# Patient Record
Sex: FEMALE | Race: WHITE | NOT HISPANIC OR LATINO | Employment: UNEMPLOYED | URBAN - METROPOLITAN AREA
[De-identification: names, ages, dates, MRNs, and addresses within clinical notes are randomized per-mention and may not be internally consistent; named-entity substitution may affect disease eponyms.]

---

## 2023-03-20 RX ORDER — FOLIC ACID/MULTIVIT,IRON,MINER 0.4MG-18MG
1 TABLET ORAL DAILY
COMMUNITY
Start: 2021-07-15 | End: 2024-04-11 | Stop reason: SDUPTHER

## 2023-03-20 RX ORDER — CEPHALEXIN 500 MG/1
1 TABLET ORAL 3 TIMES DAILY
COMMUNITY
Start: 2022-04-10 | End: 2023-10-03 | Stop reason: ALTCHOICE

## 2023-03-21 ENCOUNTER — APPOINTMENT (OUTPATIENT)
Dept: PRIMARY CARE | Facility: CLINIC | Age: 28
End: 2023-03-21
Payer: MEDICAID

## 2023-03-22 ENCOUNTER — APPOINTMENT (OUTPATIENT)
Dept: PRIMARY CARE | Facility: CLINIC | Age: 28
End: 2023-03-22
Payer: MEDICAID

## 2023-05-16 LAB
ALLERGEN FOOD: CASHEW NUT (ANACARDIUM OCCIDENTALE) IGE (KU/L): <0.1 KU/L
ALLERGEN FOOD: HAZELNUT IGE: 1.79 KU/L
ALLERGEN FOOD: PEANUT (ARACHIS HYPOGAEA) IGE (KU/L): 0.56 KU/L
ALLERGEN FOOD: PECAN NUT (CARYA ILLINOENSIS) IGE (KU/L): <0.1 KU/L
ALLERGEN FOOD: PISTACHIO (PISTACIA VERA) IGE (KU/L): <0.1 KU/L
ALLERGEN FOOD: SHRIMP (P. BOREALIS/MONODON, M. BARBATA/JOYNERI) IGE (KU/L): <0.1 KU/L
ALLERGEN FOOD: SOYBEAN (GLYCINE MAX) IGE (KU/L): 0.11 KU/L
ALLERGEN FOOD: WALNUT (JUGLANS SPP.) IGE (KU/L): 0.55 KU/L
IMMUNOCAP IGE: 130 KU/L (ref 0–214)
IMMUNOCAP INTERPRETATION: NORMAL

## 2023-05-18 LAB
ALLERGEN OCCUPATIONAL: SUNFLOWER SEED IGE (KU/L): 0.23 KU/L
C REACTIVE PROTEIN (MG/L) IN SER/PLAS: <0.1 MG/DL
CASHEW COMP. RA O3, VIRC: <0.1 KU/L
CLASS ARA H1, VIRC: 0
CLASS ARA H2, VIRC: 0
CLASS ARA H3, VIRC: 0
CLASS ARA H8, VIRC: 2
CLASS ARA H9, VIRC: 1
CLASS CASHEW RA O3 , VIRC: 0
CLASS HAZELNUT RCORA1, VIRC: 2
CLASS HAZELNUT RCORA14, VIRC: 0
CLASS HAZELNUT RCORA8, VIRC: ABNORMAL
CLASS HAZELNUT RCORA9, VIRC: 0
CLASS WALNUT RJUGR1, VIRC: 0
CLASS WALNUT RJUGR3, VIRC: ABNORMAL
COBALAMIN (VITAMIN B12) (PG/ML) IN SER/PLAS: 525 PG/ML (ref 211–911)
ERYTHROCYTE DISTRIBUTION WIDTH (RATIO) BY AUTOMATED COUNT: 13.1 % (ref 11.5–14.5)
ERYTHROCYTE MEAN CORPUSCULAR HEMOGLOBIN CONCENTRATION (G/DL) BY AUTOMATED: 31 G/DL (ref 32–36)
ERYTHROCYTE MEAN CORPUSCULAR VOLUME (FL) BY AUTOMATED COUNT: 95 FL (ref 80–100)
ERYTHROCYTES (10*6/UL) IN BLOOD BY AUTOMATED COUNT: 4.5 X10E12/L (ref 4–5.2)
FOLATE (NG/ML) IN SER/PLAS: >22.3 NG/ML
HAZELNUT COMP. RCORA1, VIRC: 1.64 KU/L
HAZELNUT COMP. RCORA14, VIRC: <0.1 KU/L
HAZELNUT COMP. RCORA8, VIRC: 0.34 KU/L
HAZELNUT COMP. RCORA9, VIRC: <0.1 KU/L
HEMATOCRIT (%) IN BLOOD BY AUTOMATED COUNT: 42.6 % (ref 36–46)
HEMOGLOBIN (G/DL) IN BLOOD: 13.2 G/DL (ref 12–16)
IMMUNOCAP INTERPRETATION (ARUP): NORMAL
LEUKOCYTES (10*3/UL) IN BLOOD BY AUTOMATED COUNT: 5.6 X10E9/L (ref 4.4–11.3)
Lab: 0.6 KU/L
Lab: <0.1 KU/L
PEANUT COMP. ARA H1, VIRC: <0.1 KU/L
PEANUT COMP. ARA H2, VIRC: <0.1 KU/L
PEANUT COMP. ARA H3, VIRC: <0.1 KU/L
PEANUT COMP. ARA H8, VIRC: 1.7 KU/L
PEANUT COMP. ARA H9, VIRC: 0.47 KU/L
PLATELETS (10*3/UL) IN BLOOD AUTOMATED COUNT: 250 X10E9/L (ref 150–450)
SEDIMENTATION RATE, ERYTHROCYTE: <1 MM/H (ref 0–20)
TISSUE TRANSGLUTAMINASE, IGA: <1 U/ML (ref 0–14)
WALNUT COMP. RJUGR1, VIRC: <0.1 KU/L
WALNUT COMP. RJUGR3, VIRC: 0.31 KU/L

## 2023-09-18 LAB — CHORIOGONADOTROPIN (MIU/ML) IN SER/PLAS: 650 MIU/ML

## 2023-09-21 LAB — CHORIOGONADOTROPIN (MIU/ML) IN SER/PLAS: 1884 MIU/ML

## 2023-09-23 LAB — CHORIOGONADOTROPIN (MIU/ML) IN SER/PLAS: 3905 MIU/ML

## 2023-09-25 LAB — CHORIOGONADOTROPIN (MIU/ML) IN SER/PLAS: ABNORMAL MIU/ML

## 2023-10-03 ENCOUNTER — TELEPHONE (OUTPATIENT)
Dept: OBSTETRICS AND GYNECOLOGY | Facility: CLINIC | Age: 28
End: 2023-10-03

## 2023-10-03 ENCOUNTER — INITIAL PRENATAL (OUTPATIENT)
Dept: OBSTETRICS AND GYNECOLOGY | Facility: CLINIC | Age: 28
End: 2023-10-03
Payer: MEDICAID

## 2023-10-03 VITALS
DIASTOLIC BLOOD PRESSURE: 64 MMHG | WEIGHT: 128.25 LBS | SYSTOLIC BLOOD PRESSURE: 116 MMHG | BODY MASS INDEX: 21.34 KG/M2

## 2023-10-03 DIAGNOSIS — Z3A.08 8 WEEKS GESTATION OF PREGNANCY (HHS-HCC): Primary | ICD-10-CM

## 2023-10-03 PROCEDURE — 99213 OFFICE O/P EST LOW 20 MIN: CPT | Performed by: OBSTETRICS & GYNECOLOGY

## 2023-10-03 PROCEDURE — H1000 PRENATAL CARE ATRISK ASSESSM: HCPCS | Performed by: OBSTETRICS & GYNECOLOGY

## 2023-10-03 ASSESSMENT — ENCOUNTER SYMPTOMS
HEMATOLOGIC/LYMPHATIC NEGATIVE: 0
NEUROLOGICAL NEGATIVE: 0
MUSCULOSKELETAL NEGATIVE: 0
CARDIOVASCULAR NEGATIVE: 0
GASTROINTESTINAL NEGATIVE: 0
RESPIRATORY NEGATIVE: 0
EYES NEGATIVE: 0
PSYCHIATRIC NEGATIVE: 0
ENDOCRINE NEGATIVE: 0
ALLERGIC/IMMUNOLOGIC NEGATIVE: 0
CONSTITUTIONAL NEGATIVE: 0

## 2023-10-03 NOTE — LETTER
October 3, 2023       No Recipients    Patient: Rubina Branch   YOB: 1995   Date of Visit: 10/3/2023       Dear Dr. Mcdonald Recipients:    Thank you for referring Rubina Branch to me for evaluation. Below are my notes for this consultation.  If you have questions, please do not hesitate to call me. I look forward to following your patient along with you.       Sincerely,     Gerda Monson MD      CC:   No Recipients  ______________________________________________________________________________________    Patient was opened in error. Please disregard .

## 2023-10-03 NOTE — TELEPHONE ENCOUNTER
Pt called stating she has a migraine and would like to know if she can take tylenol since she is 6 Wks pregnant. Please advise?

## 2023-10-03 NOTE — PROGRESS NOTES
Subjective   Patient ID [id number]   Rubina Branch is a 28 y.o.  at Unknown with a working estimated date of delivery of Estimated Date of Delivery: 24 who presents for an initial prenatal visit.    Her pregnancy is complicated by:  Bleeding 1 week mid cycle, then had pos preg test . Serial BHCGs rising.       OB History    Para Term  AB Living   3 1 1 0 1 1   SAB IAB Ectopic Multiple Live Births   0 1 0 0 1      # Outcome Date GA Lbr Herman/2nd Weight Sex Delivery Anes PTL Lv   3 Current            2 Term 22 39w0d  3062 g F Vag-Spont  N ISIDRA   1 IAB                   Objective   Physical Exam  Weight: 58.2 kg  Expected Total Weight Gain: Could not be calculated   Pregravid BMI: Could not be calculated  BP: 116/64    Fetal Heart Rate: 160     OBGyn Exam  Physical Exam  Constitutional:       Appearance: Normal appearance.   Pulmonary:      Effort: Pulmonary effort is normal.   Chest:   Breasts:     Breasts are symmetrical.      Right: Normal.      Left: Normal.   Abdominal:      General: Abdomen is flat.      Palpations: Abdomen is soft. There is no mass.   Genitourinary:     Labia:         Right: No rash.         Left: No rash.       Urethra: No urethral lesion.      Vagina: Normal.      Cervix: Normal.      Uterus: Normal.       Adnexa:         Right: No mass or tenderness.          Left: No mass or tenderness.      Prenatal Labs  Do next visit    Assessment/Plan     Single early live IUP seen w FHR .  Daily prenatal vitamins prescribed  First trimester screening and second trimester screening discussed. Patient decided to let me know next visit for genetics labs  Follow up in 4 weeks for return OB visit.    Gerda Monson MD

## 2023-10-04 NOTE — TELEPHONE ENCOUNTER
Spoke with the patient and explained that it was safe to take Tylenol in pregnancy. Patient is aware.

## 2023-10-05 LAB — BACTERIA UR CULT: NO GROWTH

## 2023-10-12 ENCOUNTER — APPOINTMENT (OUTPATIENT)
Dept: RADIOLOGY | Facility: CLINIC | Age: 28
End: 2023-10-12
Payer: MEDICAID

## 2023-10-31 ENCOUNTER — ROUTINE PRENATAL (OUTPATIENT)
Dept: OBSTETRICS AND GYNECOLOGY | Facility: CLINIC | Age: 28
End: 2023-10-31
Payer: MEDICAID

## 2023-10-31 VITALS — BODY MASS INDEX: 21.6 KG/M2 | DIASTOLIC BLOOD PRESSURE: 62 MMHG | WEIGHT: 129.8 LBS | SYSTOLIC BLOOD PRESSURE: 104 MMHG

## 2023-10-31 DIAGNOSIS — Z3A.10 10 WEEKS GESTATION OF PREGNANCY (HHS-HCC): Primary | ICD-10-CM

## 2023-10-31 LAB
ABO GROUP (TYPE) IN BLOOD: NORMAL
ANTIBODY SCREEN: NORMAL
ERYTHROCYTE [DISTWIDTH] IN BLOOD BY AUTOMATED COUNT: 13.4 % (ref 11.5–14.5)
HCT VFR BLD AUTO: 38.2 % (ref 36–46)
HGB BLD-MCNC: 12.4 G/DL (ref 12–16)
MCH RBC QN AUTO: 29.2 PG (ref 26–34)
MCHC RBC AUTO-ENTMCNC: 32.5 G/DL (ref 32–36)
MCV RBC AUTO: 90 FL (ref 80–100)
NRBC BLD-RTO: 0 /100 WBCS (ref 0–0)
PLATELET # BLD AUTO: 251 X10*3/UL (ref 150–450)
PMV BLD AUTO: 11.9 FL (ref 7.5–11.5)
RBC # BLD AUTO: 4.24 X10*6/UL (ref 4–5.2)
RH FACTOR (ANTIGEN D): NORMAL
WBC # BLD AUTO: 7.8 X10*3/UL (ref 4.4–11.3)

## 2023-10-31 PROCEDURE — 87389 HIV-1 AG W/HIV-1&-2 AB AG IA: CPT

## 2023-10-31 PROCEDURE — 86850 RBC ANTIBODY SCREEN: CPT

## 2023-10-31 PROCEDURE — 87340 HEPATITIS B SURFACE AG IA: CPT

## 2023-10-31 PROCEDURE — 87800 DETECT AGNT MULT DNA DIREC: CPT

## 2023-10-31 PROCEDURE — 99213 OFFICE O/P EST LOW 20 MIN: CPT | Performed by: OBSTETRICS & GYNECOLOGY

## 2023-10-31 PROCEDURE — 85027 COMPLETE CBC AUTOMATED: CPT

## 2023-10-31 PROCEDURE — 86317 IMMUNOASSAY INFECTIOUS AGENT: CPT

## 2023-10-31 PROCEDURE — 86901 BLOOD TYPING SEROLOGIC RH(D): CPT

## 2023-10-31 PROCEDURE — 36415 COLL VENOUS BLD VENIPUNCTURE: CPT

## 2023-10-31 PROCEDURE — 86900 BLOOD TYPING SEROLOGIC ABO: CPT

## 2023-10-31 PROCEDURE — 86780 TREPONEMA PALLIDUM: CPT

## 2023-10-31 NOTE — PROGRESS NOTES
NOB labs were drawn today with NIPTS, Pt states that she is vomiting most days and that yesterday she thinks she vomited blood. Pt DENIES Flu shot today.     Declines meds for nausea.  Declines 13 wk scan.  Will sched 20 wk scan.    Gerda Monson MD

## 2023-11-01 LAB
C TRACH RRNA SPEC QL NAA+PROBE: NEGATIVE
HBV SURFACE AG SERPL QL IA: NONREACTIVE
HIV 1+2 AB+HIV1 P24 AG SERPL QL IA: NONREACTIVE
N GONORRHOEA DNA SPEC QL PROBE+SIG AMP: NEGATIVE
REFLEX ADDED, ANEMIA PANEL: NORMAL
RUBV IGG SERPL IA-ACNC: 3.1 IA
RUBV IGG SERPL QL IA: POSITIVE
T PALLIDUM AB SER QL: NONREACTIVE

## 2023-11-20 ENCOUNTER — TELEPHONE (OUTPATIENT)
Dept: OBSTETRICS AND GYNECOLOGY | Facility: CLINIC | Age: 28
End: 2023-11-20

## 2023-11-20 ENCOUNTER — ROUTINE PRENATAL (OUTPATIENT)
Dept: OBSTETRICS AND GYNECOLOGY | Facility: CLINIC | Age: 28
End: 2023-11-20
Payer: MEDICAID

## 2023-11-20 VITALS
BODY MASS INDEX: 21.82 KG/M2 | WEIGHT: 131.13 LBS | SYSTOLIC BLOOD PRESSURE: 110 MMHG | DIASTOLIC BLOOD PRESSURE: 60 MMHG

## 2023-11-20 DIAGNOSIS — Z3A.19 19 WEEKS GESTATION OF PREGNANCY (HHS-HCC): Primary | ICD-10-CM

## 2023-11-20 DIAGNOSIS — Z3A.13 13 WEEKS GESTATION OF PREGNANCY (HHS-HCC): ICD-10-CM

## 2023-11-20 PROCEDURE — 99213 OFFICE O/P EST LOW 20 MIN: CPT | Performed by: OBSTETRICS & GYNECOLOGY

## 2023-11-20 NOTE — TELEPHONE ENCOUNTER
13.3 wk ob called OB line c/o bright red spotting this morning when she woke up, had blood on her bad and a drop in the toilet.  Just now she checked and it has some brown in it now.  Denies cramping or pain, most recent intercourse was Friday morning.  Patient is very nervous and asked if its possible for her to be seen today for reassurance before the holiday.  Patient advised that spotting/light bleeding after intercourse isn't uncommon and can happen directly following intercourse of a couple of days later.      Patient added to Dr Monson's schedule for 9:30 am.

## 2023-11-20 NOTE — PROGRESS NOTES
Spotting resolved. Cervix closed.   Live FHR seen on my US.  OB labs wnl.  Declined 13 wk felicita scan/ NIPT wnl.    Gerda Monson MD

## 2023-12-04 ENCOUNTER — APPOINTMENT (OUTPATIENT)
Dept: OBSTETRICS AND GYNECOLOGY | Facility: CLINIC | Age: 28
End: 2023-12-04
Payer: MEDICAID

## 2023-12-18 ENCOUNTER — ROUTINE PRENATAL (OUTPATIENT)
Dept: OBSTETRICS AND GYNECOLOGY | Facility: CLINIC | Age: 28
End: 2023-12-18
Payer: MEDICAID

## 2023-12-18 VITALS
DIASTOLIC BLOOD PRESSURE: 58 MMHG | WEIGHT: 135.38 LBS | BODY MASS INDEX: 22.53 KG/M2 | SYSTOLIC BLOOD PRESSURE: 104 MMHG

## 2023-12-18 DIAGNOSIS — Z3A.17 17 WEEKS GESTATION OF PREGNANCY (HHS-HCC): Primary | ICD-10-CM

## 2023-12-18 PROCEDURE — 99213 OFFICE O/P EST LOW 20 MIN: CPT | Performed by: OBSTETRICS & GYNECOLOGY

## 2024-01-02 ENCOUNTER — ANCILLARY PROCEDURE (OUTPATIENT)
Dept: RADIOLOGY | Facility: CLINIC | Age: 29
End: 2024-01-02
Payer: MEDICAID

## 2024-01-02 DIAGNOSIS — Z3A.19 19 WEEKS GESTATION OF PREGNANCY (HHS-HCC): ICD-10-CM

## 2024-01-02 PROCEDURE — 76805 OB US >/= 14 WKS SNGL FETUS: CPT | Performed by: OBSTETRICS & GYNECOLOGY

## 2024-01-02 PROCEDURE — 76805 OB US >/= 14 WKS SNGL FETUS: CPT

## 2024-01-15 ENCOUNTER — ROUTINE PRENATAL (OUTPATIENT)
Dept: OBSTETRICS AND GYNECOLOGY | Facility: CLINIC | Age: 29
End: 2024-01-15
Payer: MEDICAID

## 2024-01-15 VITALS — SYSTOLIC BLOOD PRESSURE: 118 MMHG | WEIGHT: 143 LBS | DIASTOLIC BLOOD PRESSURE: 70 MMHG | BODY MASS INDEX: 23.8 KG/M2

## 2024-01-15 DIAGNOSIS — Z3A.21 21 WEEKS GESTATION OF PREGNANCY (HHS-HCC): Primary | ICD-10-CM

## 2024-01-15 PROCEDURE — 99213 OFFICE O/P EST LOW 20 MIN: CPT | Performed by: OBSTETRICS & GYNECOLOGY

## 2024-01-15 RX ORDER — EPINEPHRINE 0.3 MG/.3ML
INJECTION SUBCUTANEOUS
COMMUNITY

## 2024-02-13 ENCOUNTER — ROUTINE PRENATAL (OUTPATIENT)
Dept: OBSTETRICS AND GYNECOLOGY | Facility: CLINIC | Age: 29
End: 2024-02-13
Payer: MEDICAID

## 2024-02-13 VITALS — WEIGHT: 147.25 LBS | SYSTOLIC BLOOD PRESSURE: 102 MMHG | BODY MASS INDEX: 24.5 KG/M2 | DIASTOLIC BLOOD PRESSURE: 58 MMHG

## 2024-02-13 DIAGNOSIS — Z3A.25 25 WEEKS GESTATION OF PREGNANCY (HHS-HCC): Primary | ICD-10-CM

## 2024-02-13 DIAGNOSIS — Z13.1 SCREENING FOR DIABETES MELLITUS: ICD-10-CM

## 2024-02-13 LAB
ERYTHROCYTE [DISTWIDTH] IN BLOOD BY AUTOMATED COUNT: 13.6 % (ref 11.5–14.5)
GLUCOSE 1H P 50 G GLC PO SERPL-MCNC: 103 MG/DL
HCT VFR BLD AUTO: 37.9 % (ref 36–46)
HGB BLD-MCNC: 12.1 G/DL (ref 12–16)
MCH RBC QN AUTO: 30.4 PG (ref 26–34)
MCHC RBC AUTO-ENTMCNC: 31.9 G/DL (ref 32–36)
MCV RBC AUTO: 95 FL (ref 80–100)
NRBC BLD-RTO: 0 /100 WBCS (ref 0–0)
PLATELET # BLD AUTO: 218 X10*3/UL (ref 150–450)
RBC # BLD AUTO: 3.98 X10*6/UL (ref 4–5.2)
WBC # BLD AUTO: 8.1 X10*3/UL (ref 4.4–11.3)

## 2024-02-13 PROCEDURE — 99213 OFFICE O/P EST LOW 20 MIN: CPT | Performed by: OBSTETRICS & GYNECOLOGY

## 2024-02-13 PROCEDURE — 36415 COLL VENOUS BLD VENIPUNCTURE: CPT

## 2024-02-13 PROCEDURE — 85027 COMPLETE CBC AUTOMATED: CPT

## 2024-02-13 PROCEDURE — 82947 ASSAY GLUCOSE BLOOD QUANT: CPT

## 2024-02-13 NOTE — PROGRESS NOTES
GTT and CBC drawn today.  Doing well. No complaints.   Saw darker urine = dip neg today.    TDAP next visit.    Gerda Monson MD

## 2024-02-14 LAB — REFLEX ADDED, ANEMIA PANEL: NORMAL

## 2024-03-11 PROBLEM — Z34.90 PRENATAL CARE, ANTEPARTUM (HHS-HCC): Status: ACTIVE | Noted: 2024-03-11

## 2024-03-12 ENCOUNTER — ROUTINE PRENATAL (OUTPATIENT)
Dept: OBSTETRICS AND GYNECOLOGY | Facility: CLINIC | Age: 29
End: 2024-03-12
Payer: MEDICAID

## 2024-03-12 ENCOUNTER — APPOINTMENT (OUTPATIENT)
Dept: OBSTETRICS AND GYNECOLOGY | Facility: CLINIC | Age: 29
End: 2024-03-12
Payer: MEDICAID

## 2024-03-12 VITALS
DIASTOLIC BLOOD PRESSURE: 60 MMHG | SYSTOLIC BLOOD PRESSURE: 102 MMHG | BODY MASS INDEX: 25.86 KG/M2 | WEIGHT: 155.38 LBS

## 2024-03-12 DIAGNOSIS — Z3A.29 29 WEEKS GESTATION OF PREGNANCY (HHS-HCC): Primary | ICD-10-CM

## 2024-03-12 DIAGNOSIS — Z34.90 PRENATAL CARE, ANTEPARTUM (HHS-HCC): ICD-10-CM

## 2024-03-12 PROCEDURE — 99213 OFFICE O/P EST LOW 20 MIN: CPT | Performed by: STUDENT IN AN ORGANIZED HEALTH CARE EDUCATION/TRAINING PROGRAM

## 2024-03-12 NOTE — PROGRESS NOTES
Subjective   Patient ID [id number]   Rubina Branch is a 29 y.o.  at 29w4d with a working estimated date of delivery of 2024, by Last Menstrual Period who presents for a routine prenatal visit. Good FM, no OB complaints. Some dyspnea with climbing stairs. Some low back pain.    Objective   Physical Exam  Vitals:    24 1032   BP: 102/60      Weight: 70.5 kg (155 lb 6 oz), Pregravid BMI: 21.30  Expected Total Weight Gain: 11.5 kg (25 lb)-16 kg (35 lb)   Fundal height and FHR per prenatal flowsheet    Assessment/Plan     Rubina Branch is a 29 y.o.  at 29w4d with a working estimated date of delivery of 2024, by Last Menstrual Period who presents for a routine prenatal visit.    Reviewed second tri labs, WNL including hgb 12. Follows a strictly vegan diet. Recommend starting vitamin B12 OTC as this is the only vitamin not found in plant-based diet. No evidence of B12 deficiency sufficient to cause anemia at this time. Discussed tdap, declined after counseling. Reviewed common discomforts of pregnancy, recommend pregnancy support belt for low back pain. Plans to breastfeed. Considering lactational amenorrhea for contraception. Did also discuss POPs, DMPA, and LARC. Considering trying for next child about 18 months after this pregnancy. Reviewed weight gain. Has gained 27 lb total this pregnancy with normal starting BMI. Discussed recommendation for 25-35 lb total weight gain. Measuring appropriately today. Remainder of plan per problem list as below.     Problem List Items Addressed This Visit       Prenatal care, antepartum    Overview     -Last pap (2021): NIL -> next due postpartum  -s/p risk-reducing cfDNA  -Declined flu vaccine  -Declined tdap  -Breast/lactational amenorrhea          Other Visit Diagnoses       29 weeks gestation of pregnancy    -  Primary          Follow up in 2 weeks for a routine prenatal visit.    Juanita Eden MD

## 2024-03-26 ENCOUNTER — ROUTINE PRENATAL (OUTPATIENT)
Dept: OBSTETRICS AND GYNECOLOGY | Facility: CLINIC | Age: 29
End: 2024-03-26
Payer: MEDICAID

## 2024-03-26 VITALS
BODY MASS INDEX: 25.65 KG/M2 | DIASTOLIC BLOOD PRESSURE: 52 MMHG | SYSTOLIC BLOOD PRESSURE: 102 MMHG | WEIGHT: 154.13 LBS

## 2024-03-26 DIAGNOSIS — Z34.90 PRENATAL CARE, ANTEPARTUM (HHS-HCC): ICD-10-CM

## 2024-03-26 DIAGNOSIS — Z3A.32 32 WEEKS GESTATION OF PREGNANCY (HHS-HCC): Primary | ICD-10-CM

## 2024-03-26 PROCEDURE — 99213 OFFICE O/P EST LOW 20 MIN: CPT | Performed by: OBSTETRICS & GYNECOLOGY

## 2024-04-11 ENCOUNTER — ROUTINE PRENATAL (OUTPATIENT)
Dept: OBSTETRICS AND GYNECOLOGY | Facility: CLINIC | Age: 29
End: 2024-04-11
Payer: MEDICAID

## 2024-04-11 VITALS — WEIGHT: 157 LBS | BODY MASS INDEX: 26.13 KG/M2 | DIASTOLIC BLOOD PRESSURE: 64 MMHG | SYSTOLIC BLOOD PRESSURE: 118 MMHG

## 2024-04-11 DIAGNOSIS — Z3A.34 34 WEEKS GESTATION OF PREGNANCY (HHS-HCC): Primary | ICD-10-CM

## 2024-04-11 PROCEDURE — 99213 OFFICE O/P EST LOW 20 MIN: CPT | Performed by: OBSTETRICS & GYNECOLOGY

## 2024-04-11 RX ORDER — FOLIC ACID/MULTIVIT,IRON,MINER 0.4MG-18MG
1 TABLET ORAL DAILY
Qty: 30 TABLET | Refills: 3 | Status: SHIPPED | OUTPATIENT
Start: 2024-04-11

## 2024-04-23 ENCOUNTER — ROUTINE PRENATAL (OUTPATIENT)
Dept: OBSTETRICS AND GYNECOLOGY | Facility: CLINIC | Age: 29
End: 2024-04-23
Payer: MEDICAID

## 2024-04-23 VITALS — DIASTOLIC BLOOD PRESSURE: 66 MMHG | BODY MASS INDEX: 26.19 KG/M2 | SYSTOLIC BLOOD PRESSURE: 110 MMHG | WEIGHT: 157.4 LBS

## 2024-04-23 DIAGNOSIS — Z3A.35 35 WEEKS GESTATION OF PREGNANCY (HHS-HCC): ICD-10-CM

## 2024-04-23 PROCEDURE — 99213 OFFICE O/P EST LOW 20 MIN: CPT | Performed by: OBSTETRICS & GYNECOLOGY

## 2024-04-23 PROCEDURE — 87081 CULTURE SCREEN ONLY: CPT

## 2024-04-25 LAB — GP B STREP GENITAL QL CULT: NORMAL

## 2024-04-30 ENCOUNTER — ROUTINE PRENATAL (OUTPATIENT)
Dept: OBSTETRICS AND GYNECOLOGY | Facility: CLINIC | Age: 29
End: 2024-04-30
Payer: MEDICAID

## 2024-04-30 VITALS — SYSTOLIC BLOOD PRESSURE: 104 MMHG | WEIGHT: 159.4 LBS | DIASTOLIC BLOOD PRESSURE: 58 MMHG | BODY MASS INDEX: 26.53 KG/M2

## 2024-04-30 DIAGNOSIS — Z3A.36 36 WEEKS GESTATION OF PREGNANCY (HHS-HCC): Primary | ICD-10-CM

## 2024-04-30 PROCEDURE — 99213 OFFICE O/P EST LOW 20 MIN: CPT | Performed by: OBSTETRICS & GYNECOLOGY

## 2024-05-02 ENCOUNTER — APPOINTMENT (OUTPATIENT)
Dept: PRIMARY CARE | Facility: CLINIC | Age: 29
End: 2024-05-02
Payer: MEDICAID

## 2024-05-07 ENCOUNTER — ROUTINE PRENATAL (OUTPATIENT)
Dept: OBSTETRICS AND GYNECOLOGY | Facility: CLINIC | Age: 29
End: 2024-05-07
Payer: MEDICAID

## 2024-05-07 VITALS — WEIGHT: 159.8 LBS | DIASTOLIC BLOOD PRESSURE: 60 MMHG | BODY MASS INDEX: 26.59 KG/M2 | SYSTOLIC BLOOD PRESSURE: 108 MMHG

## 2024-05-07 DIAGNOSIS — Z34.90 PRENATAL CARE, ANTEPARTUM (HHS-HCC): ICD-10-CM

## 2024-05-07 DIAGNOSIS — Z3A.37 37 WEEKS GESTATION OF PREGNANCY (HHS-HCC): Primary | ICD-10-CM

## 2024-05-07 PROCEDURE — 99213 OFFICE O/P EST LOW 20 MIN: CPT | Performed by: OBSTETRICS & GYNECOLOGY

## 2024-05-13 ENCOUNTER — ROUTINE PRENATAL (OUTPATIENT)
Dept: OBSTETRICS AND GYNECOLOGY | Facility: CLINIC | Age: 29
End: 2024-05-13
Payer: MEDICAID

## 2024-05-13 VITALS — DIASTOLIC BLOOD PRESSURE: 60 MMHG | WEIGHT: 159.8 LBS | BODY MASS INDEX: 26.59 KG/M2 | SYSTOLIC BLOOD PRESSURE: 102 MMHG

## 2024-05-13 DIAGNOSIS — Z3A.38 38 WEEKS GESTATION OF PREGNANCY (HHS-HCC): Primary | ICD-10-CM

## 2024-05-13 PROCEDURE — 99213 OFFICE O/P EST LOW 20 MIN: CPT | Performed by: OBSTETRICS & GYNECOLOGY

## 2024-05-13 NOTE — PROGRESS NOTES
Doing well. No complaints.   EFW 6.5 lbs     Labor plans discussed, wants to avoid pitocin- due to hearing its more painful.  Discussed concern if she nears 41 weeks .    Gerda Monson MD

## 2024-05-21 ENCOUNTER — ROUTINE PRENATAL (OUTPATIENT)
Dept: OBSTETRICS AND GYNECOLOGY | Facility: CLINIC | Age: 29
End: 2024-05-21
Payer: MEDICAID

## 2024-05-21 VITALS — BODY MASS INDEX: 26.96 KG/M2 | SYSTOLIC BLOOD PRESSURE: 108 MMHG | WEIGHT: 162 LBS | DIASTOLIC BLOOD PRESSURE: 62 MMHG

## 2024-05-21 DIAGNOSIS — Z3A.39 39 WEEKS GESTATION OF PREGNANCY (HHS-HCC): Primary | ICD-10-CM

## 2024-05-21 PROCEDURE — 99213 OFFICE O/P EST LOW 20 MIN: CPT | Performed by: OBSTETRICS & GYNECOLOGY

## 2024-05-21 NOTE — PROGRESS NOTES
Doing well. No complaints.   Cervix 1 cm , wants to avoid induction but declines  membrane stripping today. See me next week      Gerda Monson MD

## 2024-05-23 ENCOUNTER — ANESTHESIA EVENT (OUTPATIENT)
Dept: OBSTETRICS AND GYNECOLOGY | Facility: HOSPITAL | Age: 29
End: 2024-05-23
Payer: MEDICAID

## 2024-05-23 ENCOUNTER — ANESTHESIA (OUTPATIENT)
Dept: OBSTETRICS AND GYNECOLOGY | Facility: HOSPITAL | Age: 29
End: 2024-05-23
Payer: MEDICAID

## 2024-05-23 ENCOUNTER — HOSPITAL ENCOUNTER (INPATIENT)
Facility: HOSPITAL | Age: 29
LOS: 2 days | Discharge: HOME | End: 2024-05-25
Attending: OBSTETRICS & GYNECOLOGY | Admitting: OBSTETRICS & GYNECOLOGY
Payer: MEDICAID

## 2024-05-23 DIAGNOSIS — R52 PAIN: Primary | ICD-10-CM

## 2024-05-23 PROBLEM — R50.9 FEVER: Status: RESOLVED | Noted: 2024-05-23 | Resolved: 2024-05-23

## 2024-05-23 PROBLEM — B37.31 CANDIDIASIS OF VAGINA: Status: RESOLVED | Noted: 2024-05-23 | Resolved: 2024-05-23

## 2024-05-23 PROBLEM — Z37.9 NORMAL LABOR (HHS-HCC): Status: ACTIVE | Noted: 2024-05-23

## 2024-05-23 PROBLEM — O46.90 ANTEPARTUM HEMORRHAGE (HHS-HCC): Status: RESOLVED | Noted: 2024-05-23 | Resolved: 2024-05-23

## 2024-05-23 PROBLEM — N92.6 MISSED PERIOD: Status: RESOLVED | Noted: 2024-05-23 | Resolved: 2024-05-23

## 2024-05-23 PROBLEM — J30.89 ALLERGIC RHINITIS DUE TO HOUSE DUST MITE: Status: ACTIVE | Noted: 2024-05-23

## 2024-05-23 PROBLEM — R13.19 ESOPHAGEAL DYSPHAGIA: Status: ACTIVE | Noted: 2024-05-23
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
## 2024-05-23 PROBLEM — O41.00X0: Status: RESOLVED | Noted: 2024-05-23 | Resolved: 2024-05-23

## 2024-05-23 PROBLEM — T18.128A FOOD IMPACTION OF ESOPHAGUS: Status: RESOLVED | Noted: 2024-05-23 | Resolved: 2024-05-23

## 2024-05-23 PROBLEM — J45.909 ASTHMA (HHS-HCC): Status: ACTIVE | Noted: 2024-05-23

## 2024-05-23 PROBLEM — T78.1XXA ADVERSE FOOD REACTION: Status: ACTIVE | Noted: 2024-05-23

## 2024-05-23 PROBLEM — J30.1 ALLERGIC RHINITIS DUE TO POLLEN: Status: ACTIVE | Noted: 2024-05-23

## 2024-05-23 LAB
ABO GROUP (TYPE) IN BLOOD: NORMAL
ANTIBODY SCREEN: NORMAL
ERYTHROCYTE [DISTWIDTH] IN BLOOD BY AUTOMATED COUNT: 13.2 % (ref 11.5–14.5)
ERYTHROCYTE [DISTWIDTH] IN BLOOD BY AUTOMATED COUNT: 13.3 % (ref 11.5–14.5)
HCT VFR BLD AUTO: 38.8 % (ref 36–46)
HCT VFR BLD AUTO: 39.2 % (ref 36–46)
HGB BLD-MCNC: 12.5 G/DL (ref 12–16)
HGB BLD-MCNC: 12.6 G/DL (ref 12–16)
MCH RBC QN AUTO: 29.9 PG (ref 26–34)
MCH RBC QN AUTO: 30 PG (ref 26–34)
MCHC RBC AUTO-ENTMCNC: 31.9 G/DL (ref 32–36)
MCHC RBC AUTO-ENTMCNC: 32.5 G/DL (ref 32–36)
MCV RBC AUTO: 92 FL (ref 80–100)
MCV RBC AUTO: 94 FL (ref 80–100)
NRBC BLD-RTO: 0 /100 WBCS (ref 0–0)
NRBC BLD-RTO: 0 /100 WBCS (ref 0–0)
PLATELET # BLD AUTO: 143 X10*3/UL (ref 150–450)
PLATELET # BLD AUTO: 159 X10*3/UL (ref 150–450)
RBC # BLD AUTO: 4.18 X10*6/UL (ref 4–5.2)
RBC # BLD AUTO: 4.2 X10*6/UL (ref 4–5.2)
RH FACTOR (ANTIGEN D): NORMAL
TREPONEMA PALLIDUM IGG+IGM AB [PRESENCE] IN SERUM OR PLASMA BY IMMUNOASSAY: NONREACTIVE
WBC # BLD AUTO: 15.6 X10*3/UL (ref 4.4–11.3)
WBC # BLD AUTO: 8.4 X10*3/UL (ref 4.4–11.3)

## 2024-05-23 PROCEDURE — 1220000001 HC OB SEMI-PRIVATE ROOM DAILY

## 2024-05-23 PROCEDURE — 85027 COMPLETE CBC AUTOMATED: CPT | Performed by: ADVANCED PRACTICE MIDWIFE

## 2024-05-23 PROCEDURE — 86780 TREPONEMA PALLIDUM: CPT | Mod: STJLAB | Performed by: OBSTETRICS & GYNECOLOGY

## 2024-05-23 PROCEDURE — 86901 BLOOD TYPING SEROLOGIC RH(D): CPT | Performed by: OBSTETRICS & GYNECOLOGY

## 2024-05-23 PROCEDURE — 01967 NEURAXL LBR ANES VAG DLVR: CPT | Performed by: NURSE ANESTHETIST, CERTIFIED REGISTERED

## 2024-05-23 PROCEDURE — 85027 COMPLETE CBC AUTOMATED: CPT | Performed by: OBSTETRICS & GYNECOLOGY

## 2024-05-23 PROCEDURE — 7100000016 HC LABOR RECOVERY PER HOUR

## 2024-05-23 PROCEDURE — 2500000001 HC RX 250 WO HCPCS SELF ADMINISTERED DRUGS (ALT 637 FOR MEDICARE OP)

## 2024-05-23 PROCEDURE — 7210000002 HC LABOR PER HOUR

## 2024-05-23 PROCEDURE — 59409 OBSTETRICAL CARE: CPT | Performed by: ADVANCED PRACTICE MIDWIFE

## 2024-05-23 PROCEDURE — 2500000004 HC RX 250 GENERAL PHARMACY W/ HCPCS (ALT 636 FOR OP/ED): Performed by: OBSTETRICS & GYNECOLOGY

## 2024-05-23 PROCEDURE — 59050 FETAL MONITOR W/REPORT: CPT

## 2024-05-23 PROCEDURE — 36415 COLL VENOUS BLD VENIPUNCTURE: CPT | Performed by: OBSTETRICS & GYNECOLOGY

## 2024-05-23 PROCEDURE — 51702 INSERT TEMP BLADDER CATH: CPT

## 2024-05-23 PROCEDURE — 37799 UNLISTED PX VASCULAR SURGERY: CPT | Performed by: ADVANCED PRACTICE MIDWIFE

## 2024-05-23 PROCEDURE — 0KQM0ZZ REPAIR PERINEUM MUSCLE, OPEN APPROACH: ICD-10-PCS | Performed by: ADVANCED PRACTICE MIDWIFE

## 2024-05-23 RX ORDER — CARBOPROST TROMETHAMINE 250 UG/ML
250 INJECTION, SOLUTION INTRAMUSCULAR ONCE AS NEEDED
Status: DISCONTINUED | OUTPATIENT
Start: 2024-05-23 | End: 2024-05-23

## 2024-05-23 RX ORDER — ONDANSETRON HYDROCHLORIDE 2 MG/ML
4 INJECTION, SOLUTION INTRAVENOUS EVERY 6 HOURS PRN
Status: DISCONTINUED | OUTPATIENT
Start: 2024-05-23 | End: 2024-05-23

## 2024-05-23 RX ORDER — LIDOCAINE HYDROCHLORIDE 10 MG/ML
30 INJECTION INFILTRATION; PERINEURAL ONCE AS NEEDED
Status: DISCONTINUED | OUTPATIENT
Start: 2024-05-23 | End: 2024-05-23

## 2024-05-23 RX ORDER — CARBOPROST TROMETHAMINE 250 UG/ML
250 INJECTION, SOLUTION INTRAMUSCULAR ONCE AS NEEDED
Status: DISCONTINUED | OUTPATIENT
Start: 2024-05-23 | End: 2024-05-25 | Stop reason: HOSPADM

## 2024-05-23 RX ORDER — METHYLERGONOVINE MALEATE 0.2 MG/ML
0.2 INJECTION INTRAVENOUS ONCE AS NEEDED
Status: DISCONTINUED | OUTPATIENT
Start: 2024-05-23 | End: 2024-05-23

## 2024-05-23 RX ORDER — BISACODYL 10 MG/1
10 SUPPOSITORY RECTAL DAILY PRN
Status: DISCONTINUED | OUTPATIENT
Start: 2024-05-23 | End: 2024-05-25 | Stop reason: HOSPADM

## 2024-05-23 RX ORDER — ONDANSETRON 4 MG/1
4 TABLET, FILM COATED ORAL EVERY 6 HOURS PRN
Status: DISCONTINUED | OUTPATIENT
Start: 2024-05-23 | End: 2024-05-23

## 2024-05-23 RX ORDER — SODIUM CHLORIDE, SODIUM LACTATE, POTASSIUM CHLORIDE, CALCIUM CHLORIDE 600; 310; 30; 20 MG/100ML; MG/100ML; MG/100ML; MG/100ML
125 INJECTION, SOLUTION INTRAVENOUS CONTINUOUS
Status: DISCONTINUED | OUTPATIENT
Start: 2024-05-23 | End: 2024-05-23

## 2024-05-23 RX ORDER — DIPHENHYDRAMINE HYDROCHLORIDE 50 MG/ML
25 INJECTION INTRAMUSCULAR; INTRAVENOUS EVERY 6 HOURS PRN
Status: DISCONTINUED | OUTPATIENT
Start: 2024-05-23 | End: 2024-05-25 | Stop reason: HOSPADM

## 2024-05-23 RX ORDER — ONDANSETRON HYDROCHLORIDE 2 MG/ML
4 INJECTION, SOLUTION INTRAVENOUS EVERY 6 HOURS PRN
Status: DISCONTINUED | OUTPATIENT
Start: 2024-05-23 | End: 2024-05-25 | Stop reason: HOSPADM

## 2024-05-23 RX ORDER — OXYTOCIN 10 [USP'U]/ML
10 INJECTION, SOLUTION INTRAMUSCULAR; INTRAVENOUS ONCE AS NEEDED
Status: DISCONTINUED | OUTPATIENT
Start: 2024-05-23 | End: 2024-05-23

## 2024-05-23 RX ORDER — OXYTOCIN 10 [USP'U]/ML
10 INJECTION, SOLUTION INTRAMUSCULAR; INTRAVENOUS ONCE AS NEEDED
Status: DISCONTINUED | OUTPATIENT
Start: 2024-05-23 | End: 2024-05-25 | Stop reason: HOSPADM

## 2024-05-23 RX ORDER — METHYLERGONOVINE MALEATE 0.2 MG/ML
0.2 INJECTION INTRAVENOUS ONCE AS NEEDED
Status: DISCONTINUED | OUTPATIENT
Start: 2024-05-23 | End: 2024-05-25 | Stop reason: HOSPADM

## 2024-05-23 RX ORDER — ACETAMINOPHEN 325 MG/1
975 TABLET ORAL EVERY 6 HOURS
Status: DISCONTINUED | OUTPATIENT
Start: 2024-05-23 | End: 2024-05-25 | Stop reason: HOSPADM

## 2024-05-23 RX ORDER — LOPERAMIDE HYDROCHLORIDE 2 MG/1
4 CAPSULE ORAL EVERY 2 HOUR PRN
Status: DISCONTINUED | OUTPATIENT
Start: 2024-05-23 | End: 2024-05-25 | Stop reason: HOSPADM

## 2024-05-23 RX ORDER — LABETALOL HYDROCHLORIDE 5 MG/ML
20 INJECTION, SOLUTION INTRAVENOUS ONCE AS NEEDED
Status: DISCONTINUED | OUTPATIENT
Start: 2024-05-23 | End: 2024-05-25 | Stop reason: HOSPADM

## 2024-05-23 RX ORDER — FENTANYL/ROPIVACAINE/NS/PF 2MCG/ML-.2
0-25 PLASTIC BAG, INJECTION (ML) INJECTION CONTINUOUS
Status: DISCONTINUED | OUTPATIENT
Start: 2024-05-23 | End: 2024-05-23

## 2024-05-23 RX ORDER — POLYETHYLENE GLYCOL 3350 17 G/17G
17 POWDER, FOR SOLUTION ORAL 2 TIMES DAILY PRN
Status: DISCONTINUED | OUTPATIENT
Start: 2024-05-23 | End: 2024-05-25 | Stop reason: HOSPADM

## 2024-05-23 RX ORDER — TRANEXAMIC ACID 100 MG/ML
1000 INJECTION, SOLUTION INTRAVENOUS ONCE AS NEEDED
Status: DISCONTINUED | OUTPATIENT
Start: 2024-05-23 | End: 2024-05-23

## 2024-05-23 RX ORDER — LIDOCAINE 560 MG/1
1 PATCH PERCUTANEOUS; TOPICAL; TRANSDERMAL
Status: DISCONTINUED | OUTPATIENT
Start: 2024-05-23 | End: 2024-05-25 | Stop reason: HOSPADM

## 2024-05-23 RX ORDER — NIFEDIPINE 10 MG/1
10 CAPSULE ORAL ONCE AS NEEDED
Status: DISCONTINUED | OUTPATIENT
Start: 2024-05-23 | End: 2024-05-25 | Stop reason: HOSPADM

## 2024-05-23 RX ORDER — ADHESIVE BANDAGE
10 BANDAGE TOPICAL
Status: DISCONTINUED | OUTPATIENT
Start: 2024-05-23 | End: 2024-05-25 | Stop reason: HOSPADM

## 2024-05-23 RX ORDER — MISOPROSTOL 200 UG/1
800 TABLET ORAL ONCE AS NEEDED
Status: DISCONTINUED | OUTPATIENT
Start: 2024-05-23 | End: 2024-05-25 | Stop reason: HOSPADM

## 2024-05-23 RX ORDER — LOPERAMIDE HYDROCHLORIDE 2 MG/1
4 CAPSULE ORAL EVERY 2 HOUR PRN
Status: DISCONTINUED | OUTPATIENT
Start: 2024-05-23 | End: 2024-05-23

## 2024-05-23 RX ORDER — IBUPROFEN 600 MG/1
600 TABLET ORAL EVERY 6 HOURS
Status: DISCONTINUED | OUTPATIENT
Start: 2024-05-23 | End: 2024-05-25 | Stop reason: HOSPADM

## 2024-05-23 RX ORDER — MINERAL OIL
OIL (ML) ORAL
Status: COMPLETED
Start: 2024-05-23 | End: 2024-05-23

## 2024-05-23 RX ORDER — TERBUTALINE SULFATE 1 MG/ML
0.25 INJECTION SUBCUTANEOUS ONCE AS NEEDED
Status: DISCONTINUED | OUTPATIENT
Start: 2024-05-23 | End: 2024-05-23

## 2024-05-23 RX ORDER — METOCLOPRAMIDE 10 MG/1
10 TABLET ORAL EVERY 6 HOURS PRN
Status: DISCONTINUED | OUTPATIENT
Start: 2024-05-23 | End: 2024-05-23

## 2024-05-23 RX ORDER — HYDRALAZINE HYDROCHLORIDE 20 MG/ML
5 INJECTION INTRAMUSCULAR; INTRAVENOUS ONCE AS NEEDED
Status: DISCONTINUED | OUTPATIENT
Start: 2024-05-23 | End: 2024-05-23

## 2024-05-23 RX ORDER — LABETALOL HYDROCHLORIDE 5 MG/ML
20 INJECTION, SOLUTION INTRAVENOUS ONCE AS NEEDED
Status: DISCONTINUED | OUTPATIENT
Start: 2024-05-23 | End: 2024-05-23

## 2024-05-23 RX ORDER — MISOPROSTOL 200 UG/1
800 TABLET ORAL ONCE AS NEEDED
Status: DISCONTINUED | OUTPATIENT
Start: 2024-05-23 | End: 2024-05-23

## 2024-05-23 RX ORDER — OXYTOCIN/0.9 % SODIUM CHLORIDE 30/500 ML
60 PLASTIC BAG, INJECTION (ML) INTRAVENOUS ONCE AS NEEDED
Status: DISCONTINUED | OUTPATIENT
Start: 2024-05-23 | End: 2024-05-23

## 2024-05-23 RX ORDER — OXYTOCIN/0.9 % SODIUM CHLORIDE 30/500 ML
60 PLASTIC BAG, INJECTION (ML) INTRAVENOUS ONCE AS NEEDED
Status: DISCONTINUED | OUTPATIENT
Start: 2024-05-23 | End: 2024-05-25 | Stop reason: HOSPADM

## 2024-05-23 RX ORDER — SIMETHICONE 80 MG
80 TABLET,CHEWABLE ORAL 4 TIMES DAILY PRN
Status: DISCONTINUED | OUTPATIENT
Start: 2024-05-23 | End: 2024-05-25 | Stop reason: HOSPADM

## 2024-05-23 RX ORDER — HYDRALAZINE HYDROCHLORIDE 20 MG/ML
5 INJECTION INTRAMUSCULAR; INTRAVENOUS ONCE AS NEEDED
Status: DISCONTINUED | OUTPATIENT
Start: 2024-05-23 | End: 2024-05-25 | Stop reason: HOSPADM

## 2024-05-23 RX ORDER — TRANEXAMIC ACID 100 MG/ML
1000 INJECTION, SOLUTION INTRAVENOUS ONCE AS NEEDED
Status: DISCONTINUED | OUTPATIENT
Start: 2024-05-23 | End: 2024-05-25 | Stop reason: HOSPADM

## 2024-05-23 RX ORDER — NIFEDIPINE 10 MG/1
10 CAPSULE ORAL ONCE AS NEEDED
Status: DISCONTINUED | OUTPATIENT
Start: 2024-05-23 | End: 2024-05-23

## 2024-05-23 RX ORDER — ONDANSETRON 4 MG/1
4 TABLET, FILM COATED ORAL EVERY 6 HOURS PRN
Status: DISCONTINUED | OUTPATIENT
Start: 2024-05-23 | End: 2024-05-25 | Stop reason: HOSPADM

## 2024-05-23 RX ORDER — DIPHENHYDRAMINE HCL 25 MG
25 TABLET ORAL EVERY 6 HOURS PRN
Status: DISCONTINUED | OUTPATIENT
Start: 2024-05-23 | End: 2024-05-25 | Stop reason: HOSPADM

## 2024-05-23 RX ORDER — METOCLOPRAMIDE HYDROCHLORIDE 5 MG/ML
10 INJECTION INTRAMUSCULAR; INTRAVENOUS EVERY 6 HOURS PRN
Status: DISCONTINUED | OUTPATIENT
Start: 2024-05-23 | End: 2024-05-23

## 2024-05-23 RX ADMIN — SODIUM CHLORIDE, POTASSIUM CHLORIDE, SODIUM LACTATE AND CALCIUM CHLORIDE 125 ML/HR: 600; 310; 30; 20 INJECTION, SOLUTION INTRAVENOUS at 16:35

## 2024-05-23 RX ADMIN — SODIUM CHLORIDE, POTASSIUM CHLORIDE, SODIUM LACTATE AND CALCIUM CHLORIDE 500 ML: 600; 310; 30; 20 INJECTION, SOLUTION INTRAVENOUS at 15:09

## 2024-05-23 RX ADMIN — MINERAL OIL: 1000 SOLUTION ORAL at 17:21

## 2024-05-23 RX ADMIN — SODIUM CHLORIDE, POTASSIUM CHLORIDE, SODIUM LACTATE AND CALCIUM CHLORIDE 125 ML/HR: 600; 310; 30; 20 INJECTION, SOLUTION INTRAVENOUS at 15:09

## 2024-05-23 RX ADMIN — Medication 8 ML/HR: at 15:52

## 2024-05-23 SDOH — HEALTH STABILITY: MENTAL HEALTH: SUICIDAL BEHAVIOR (LIFETIME): NO

## 2024-05-23 SDOH — SOCIAL STABILITY: SOCIAL INSECURITY: HAVE YOU HAD ANY THOUGHTS OF HARMING ANYONE ELSE?: NO

## 2024-05-23 SDOH — SOCIAL STABILITY: SOCIAL INSECURITY: ABUSE SCREEN: ADULT

## 2024-05-23 SDOH — SOCIAL STABILITY: SOCIAL INSECURITY: DO YOU FEEL ANYONE HAS EXPLOITED OR TAKEN ADVANTAGE OF YOU FINANCIALLY OR OF YOUR PERSONAL PROPERTY?: NO

## 2024-05-23 SDOH — HEALTH STABILITY: MENTAL HEALTH: WISH TO BE DEAD (PAST 1 MONTH): NO

## 2024-05-23 SDOH — SOCIAL STABILITY: SOCIAL INSECURITY: HAS ANYONE EVER THREATENED TO HURT YOUR FAMILY OR YOUR PETS?: NO

## 2024-05-23 SDOH — HEALTH STABILITY: MENTAL HEALTH: WERE YOU ABLE TO COMPLETE ALL THE BEHAVIORAL HEALTH SCREENINGS?: YES

## 2024-05-23 SDOH — HEALTH STABILITY: MENTAL HEALTH: NON-SPECIFIC ACTIVE SUICIDAL THOUGHTS (PAST 1 MONTH): NO

## 2024-05-23 SDOH — SOCIAL STABILITY: SOCIAL INSECURITY: VERBAL ABUSE: DENIES

## 2024-05-23 SDOH — SOCIAL STABILITY: SOCIAL INSECURITY: ARE YOU OR HAVE YOU BEEN THREATENED OR ABUSED PHYSICALLY, EMOTIONALLY, OR SEXUALLY BY ANYONE?: NO

## 2024-05-23 SDOH — SOCIAL STABILITY: SOCIAL INSECURITY: HAVE YOU HAD THOUGHTS OF HARMING ANYONE ELSE?: NO

## 2024-05-23 SDOH — ECONOMIC STABILITY: HOUSING INSECURITY: DO YOU FEEL UNSAFE GOING BACK TO THE PLACE WHERE YOU ARE LIVING?: NO

## 2024-05-23 SDOH — SOCIAL STABILITY: SOCIAL INSECURITY: ARE THERE ANY APPARENT SIGNS OF INJURIES/BEHAVIORS THAT COULD BE RELATED TO ABUSE/NEGLECT?: NO

## 2024-05-23 SDOH — SOCIAL STABILITY: SOCIAL INSECURITY: PHYSICAL ABUSE: DENIES

## 2024-05-23 SDOH — SOCIAL STABILITY: SOCIAL INSECURITY: DOES ANYONE TRY TO KEEP YOU FROM HAVING/CONTACTING OTHER FRIENDS OR DOING THINGS OUTSIDE YOUR HOME?: NO

## 2024-05-23 ASSESSMENT — PAIN SCALES - GENERAL
PAINLEVEL_OUTOF10: 1
PAINLEVEL_OUTOF10: 0 - NO PAIN
PAINLEVEL_OUTOF10: 2
PAINLEVEL_OUTOF10: 0 - NO PAIN
PAINLEVEL_OUTOF10: 4
PAINLEVEL_OUTOF10: 5 - MODERATE PAIN
PAINLEVEL_OUTOF10: 2
PAINLEVEL_OUTOF10: 0 - NO PAIN

## 2024-05-23 ASSESSMENT — PAIN DESCRIPTION - DESCRIPTORS
DESCRIPTORS: CRAMPING

## 2024-05-23 ASSESSMENT — LIFESTYLE VARIABLES
AUDIT-C TOTAL SCORE: 0
AUDIT-C TOTAL SCORE: 0
SKIP TO QUESTIONS 9-10: 1
HOW OFTEN DO YOU HAVE 6 OR MORE DRINKS ON ONE OCCASION: NEVER
HOW OFTEN DO YOU HAVE A DRINK CONTAINING ALCOHOL: NEVER
HOW MANY STANDARD DRINKS CONTAINING ALCOHOL DO YOU HAVE ON A TYPICAL DAY: PATIENT DOES NOT DRINK

## 2024-05-23 ASSESSMENT — ACTIVITIES OF DAILY LIVING (ADL)
JUDGMENT_ADEQUATE_SAFELY_COMPLETE_DAILY_ACTIVITIES: YES
LACK_OF_TRANSPORTATION: NO
BATHING: INDEPENDENT
HEARING - RIGHT EAR: FUNCTIONAL
PATIENT'S MEMORY ADEQUATE TO SAFELY COMPLETE DAILY ACTIVITIES?: YES
HEARING - LEFT EAR: FUNCTIONAL
ADEQUATE_TO_COMPLETE_ADL: YES
GROOMING: INDEPENDENT
DRESSING YOURSELF: INDEPENDENT
WALKS IN HOME: INDEPENDENT
FEEDING YOURSELF: INDEPENDENT
LACK_OF_TRANSPORTATION: NO
TOILETING: INDEPENDENT

## 2024-05-23 ASSESSMENT — PATIENT HEALTH QUESTIONNAIRE - PHQ9
SUM OF ALL RESPONSES TO PHQ9 QUESTIONS 1 & 2: 0
1. LITTLE INTEREST OR PLEASURE IN DOING THINGS: NOT AT ALL
2. FEELING DOWN, DEPRESSED OR HOPELESS: NOT AT ALL

## 2024-05-23 NOTE — ANESTHESIA PREPROCEDURE EVALUATION
Patient: Rubina Branch    Evaluation Method: In-person visit    Procedure Information    Date: 24  Procedure: Labor Consult     Pt  @ 39.6 weeks, admitted in labor    Relevant Problems   Anesthesia (within normal limits)      Cardiac (within normal limits)      Pulmonary  Seasonal allergy related. No albuterol use in years   (+) Asthma ([facility])      Neuro (within normal limits)      GI (within normal limits)      /Renal (within normal limits)      Liver (within normal limits)      Endocrine (within normal limits)      Hematology (within normal limits)      Musculoskeletal  LBP since last pregnancy. No follow up       Clinical information reviewed:   Tobacco  Allergies  Meds  Problems  Med Hx  Surg Hx   Fam Hx  Soc   Hx        NPO Detail:  NPO/Void Status  Date of Last Liquid: 24  Time of Last Liquid:   Date of Last Solid: 24         OB/Gyn Evaluation    Present Pregnancy    Patient is pregnant now.   Obstetric History            Physical Exam    Airway  Mallampati: II  TM distance: >3 FB  Neck ROM: full     Cardiovascular    Dental        Pulmonary    Abdominal      Other findings: Tongue ring         Anesthesia Plan    History of general anesthesia?: yes  History of complications of general anesthesia?: no    (I informed and discussed the risks and benefits of general, spinal and epidural anesthesia with the patient.  The patient expressed her understanding and her questions were answered.  A verbal consent was given by the patient.   )  Anesthetic plan and risks discussed with patient.  Use of blood products discussed with patient who consented to blood products.

## 2024-05-23 NOTE — H&P
Obstetrical Admission History and Physical     Rubina Branch is a 29 y.o.  at 39w6d. JOANNE: 2024, by Last Menstrual Period. Estimated fetal weight: 8-0. She has had prenatal care with Dr. Monson .    Chief Complaint: No chief complaint on file.    Assessment/Plan    Active Labor - patient would prefer not to have Pitocin - will continue with conservative management at this point    Principal Problem:    Normal labor ([facility])      Pregnancy Problems (from 10/03/23 to present)       Problem Noted Resolved    Prenatal care, antepartum ([facility]) 3/11/2024 by Juanita Eden MD No    Priority:  Medium      Overview Addendum 3/12/2024 10:52 AM by Juanita Eden MD     -Last pap (2021): NIL -> next due postpartum  -s/p risk-reducing cfDNA  -Declined flu vaccine  -Declined tdap  -Breast/lactational amenorrhea               Options for delivery have been discussed with the patient and she elects a vaginal delivery.  Labor has been discussed in detail. The risks, benefits, complications, alternatives, expected outcomes, potential problems during recuperation and recovery, and the risks of not performing the procedure were discussed with the patient. The patient stated understanding that the risks of delivery include, but are not limited to: death; reaction to medications; injury to bowel, bladder, ureters, uterus, cervix, vagina, and other pelvic and abdominal structures, infection; blood loss and possible need for transfusion; and potential need for surgery, including hysterectomy. The risks of injury to the infant during delivery were also discussed. All questions were answered. There was concurrence with the planned procedure, and the patient wanted to proceed.    Admit to inpatient status. I anticipate that this patient will require a stay exceeding at least 2 midnights for delivery and postpartum care.  Active management of labor.  Management of pregnancy complications, as indicated.    Subjective    Rubina is here complaining of q5 min contractions. Good fetal movement. Denies vaginal bleeding., Denies leaking of fluid.          Obstetrical History   OB History    Para Term  AB Living   3 1 1 0 1 1   SAB IAB Ectopic Multiple Live Births   0 1 0 0 1      # Outcome Date GA Lbr Herman/2nd Weight Sex Delivery Anes PTL Lv   3 Current            2 Term 22 39w0d  3.062 kg F Vag-Spont  N ISIDRA   1 IAB                Past Medical History  History reviewed. No pertinent past medical history.     Past Surgical History   History reviewed. No pertinent surgical history.    Social History  Social History     Tobacco Use    Smoking status: Never    Smokeless tobacco: Never   Substance Use Topics    Alcohol use: Not Currently     Substance and Sexual Activity   Drug Use Never       Allergies  Nut - unspecified and Soy     Medications  Medications Prior to Admission   Medication Sig Dispense Refill Last Dose    prenatal vitamin, iron-folic, (PNV cmb#95-ferrous fumarate-FA) 27 mg iron-800 mcg folic acid tablet Take 1 tablet by mouth once daily. 30 tablet 3 Past Week    EPINEPHrine 0.3 mg/0.3 mL injection syringe INJECT 0.3MG INTO LATERAL THIGH FOR SEVERE ALLERGIC REACTION          Objective    Last Vitals  Temp Pulse Resp BP MAP O2 Sat   36.8 °C (98.3 °F) 73 18 108/70   (!) 92 %     Physical Examination  GENERAL: Examination reveals a well developed, well nourished, gravid female in no acute distress. She is alert and cooperative.  LUNGS: clear to auscultation bilaterally  HEART: regular rate and rhythm, S1, S2 normal, no murmur, click, rub or gallop  ABDOMEN: soft, gravid, nontender, nondistended, no abnormal masses, no epigastric pain  EXTREMITIES: no redness or tenderness in the calves or thighs, no edema  SKIN: normal coloration and turgor, no rashes  NEUROLOGICAL: alert, oriented, normal speech, no focal findings or movement disorder noted  PSYCHOLOGICAL: awake and alert; oriented to person, place, and  "time    Lab Review  No results found for: \"WBC\", \"HGB\", \"HCT\", \"PLT\"    "

## 2024-05-23 NOTE — ANESTHESIA PREPROCEDURE EVALUATION
Patient: Rubina Branch    Evaluation Method: In-person visit    Procedure Information    Date: 05/23/24  Procedure: Labor Analgesia         Relevant Problems   Pulmonary   (+) Asthma ([facility])       Clinical information reviewed:   Tobacco  Allergies  Meds  Problems  Med Hx  Surg Hx   Fam Hx  Soc   Hx        NPO Detail:  NPO/Void Status  Date of Last Liquid: 05/22/24  Time of Last Liquid: 1900  Date of Last Solid: 05/22/24         OB/GYN     Physical Exam    Airway  Mallampati: II     Cardiovascular - normal exam     Dental    Pulmonary - normal exam     Abdominal            Anesthesia Plan    History of general anesthesia?: yes  History of complications of general anesthesia?: no    ASA 2     epidural   (I informed and discussed the risks and benefits of general, spinal and epidural anesthesia with the patient.  The patient expressed her understanding and her questions were answered.  A verbal consent was given by the patient.  Patient has no history of problems with anesthesia  Patient has no family history of problems with anesthesia)

## 2024-05-23 NOTE — L&D DELIVERY NOTE
OB Delivery Note  2024  Rubina Branch  29 y.o.   Vaginal, Spontaneous        Gestational Age: 39w6d  /Para:   Quantitative Blood Loss: Admission to Discharge: 550 mL (2024  2:25 AM - 2024  7:13 PM)    Vega Branch [[id number]]      Labor Events    Rupture date/time: 2024 1614  Rupture type: Spontaneous  Fluid color: Clear  Fluid odor: None  Labor type: Spontaneous Onset of Labor  Labor allowed to proceed with plans for an attempted vaginal birth?: Yes  Augmentation: None  Complications: None       Labor Event Times    Dilation complete date/time: 2024 1630  Start pushing date/time: 2024       Placenta    Placenta delivery date/time: 2024 172  Placenta removal: Spontaneous  Placenta appearance: Intact  Placenta disposition: discarded       Cord    Vessels: 3 vessels  Complications: Nuchal  Nuchal intervention: reduced  Nuchal cord description: loose nuchal cord  Delayed cord clamping?: Yes  Cord blood disposition: Lab  Gases sent?: No       Lacerations    Episiotomy: None  Perineal laceration: 2nd  Perineal laceration repaired?: Yes  Other lacerations?: No  Repair suture: 3-0 Synthetic Suture       Anesthesia    Method: Epidural       Operative Delivery    Forceps attempted?: No  Vacuum extractor attempted?: No       Shoulder Dystocia    Shoulder dystocia present?: No       [place] Delivery    Time head delivered: 2024 17:21:00  Birth date/time: 2024 17:21:00  Delivery type: Vaginal, Spontaneous  Complications: None       Resuscitation    Method: Tactile stimulation       Apgars    Living status: Living  Apgar Component Scores:  1 min.:  5 min.:  10 min.:  15 min.:  20 min.:    Skin color:  1  1       Heart rate:  2  2       Reflex irritability:  2  2       Muscle tone:  2  2       Respiratory effort:  2  2       Total:  9  9       Apgars assigned by: REINIER BLAND       Delivery Providers    Delivering clinician: FRED Pruitt   Provider Role     Shayna Saunders, RN Delivery Nurse    Estefani Morrissey, RN Nursery Nurse     Resident               Pt complete and +1 feeling pressure with contractions.  SVB male infant over intact perineum, nuchal x1 reduced by somersault at birth; infant to maternal abdomen, cry with stimulation. Cord clamped and cut with cease of pulsation, cord blood obtained.  Placenta delivered with light traction, grossly intact.  Fundus firm with massage, a clot was expressed during repair.  Inspection reveals a second degree perineal laceration, repair with 3-0 vicryl.  Mother and baby skin to skin.    Olga Martinez, APRN-CNM

## 2024-05-23 NOTE — PROGRESS NOTES
Subjective:  Rubina Branch is almost comfortable with epidural. Rectal pressure with contractions.  Dr Lynn in OB Emergency.  Objective:    Vitals:    24 1608 24 1610 24 1613 24 1614   BP:  113/50 108/70    Pulse: 76 80 89 93   Resp:       Temp:       TempSrc:       SpO2: (!) 94%  (!) 92% 98%   Weight:       Height:            Fetal Monitoring      Baseline FHR: 135 per minute  Variability: moderate  Accelerations: no  Decelerations:  to 100bpm with srom, recovers 120bpm  TOCO: regular, every 2-3 minutes    Cervical Exam:  pt defers exam, SROM clear fluid and bloody show  Membrane status: ruptured, clear fluid      Assessment/Plan:  29 y.o.  at 39w6d  Active labor with signs of progress  SROM clear  FHT Category 1  Maternal VSS  Continue to monitor labor progress PRN.

## 2024-05-23 NOTE — CARE PLAN
Problem: Antepartum  Goal: Maintain pregnancy as long as maternal and/or fetal condition is stable  Outcome: Progressing  Flowsheets (Taken 2024)  Maintain pregnancy as long as maternal and/or fetal condition is stable:   Maternal surveillance   Fetal surveillance   Monitor uterine activity   Medications as ordered  Goal: FHR remains reassuring  Outcome: Progressing  Flowsheets (Taken 2024)  FHR remains reassuring: Fetal monitoring , intermittent  Goal: Minimize anxiety/maximize coping  Outcome: Progressing  Flowsheets (Taken 2024)  Minimize anxiety/maximize coping:   Clustered care, diversional tx as desired, consult(s) PRN   Education on s/sx to report to RN/team   Friend/family support and/or input in POC and/or primary RN team   Referrals PRN ie. spiritual care, SW, MFM, lactation     Problem: Vaginal Birth or  Section  Goal: Fetal and maternal status remain reassuring during the birth process  Outcome: Progressing  Flowsheets (Taken 2024)  Fetal and maternal status remain reassuring during the birth process:   Monitor vital signs   Monitor fetal heart rate   Monitor uterine activity   Med administration/monitoring of effect   Monitor labor progression (Vaginal delivery)  Goal: Demonstrates labor coping techniques through delivery  Outcome: Progressing  Flowsheets (Taken 2024)  Demonstrates labor coping techniques through delivery:   Positioning, turning, and/or use of birthing ball assistance   Breathing/relaxation assistance  Goal: No s/sx of infection through recovery  Outcome: Progressing  Flowsheets (Taken 2024)  No s/sx of infection through recovery:   Hygiene practices/terri-care   Monitor QBL and vital signs  Goal: No s/sx of hemorrhage through recovery  Outcome: Progressing  Flowsheets (Taken 2024)  No s/sx of hemorrhage through recovery: Monitor QBL and vital signs   The patient's goals for the shift include Safe labor and  delivery    The clinical goals for the shift include Safe labor and delivery

## 2024-05-23 NOTE — CARE PLAN
Problem: Vaginal Birth or  Section  Goal: Fetal and maternal status remain reassuring during the birth process  2024 by Shayna Saunders RN  Outcome: Met  2024 by Shayna Saunders RN  Outcome: Progressing  Goal: Demonstrates labor coping techniques through delivery  2024 by Shayna Saunders RN  Outcome: Met  2024 by Shayna Saunders RN  Outcome: Progressing  Goal: No s/sx of infection through recovery  2024 by Shayna Saunders RN  Outcome: Met  2024 by Shayna Saunders RN  Outcome: Progressing  Goal: No s/sx of hemorrhage through recovery  2024 by Shayna Saunders RN  Outcome: Met  2024 by Shayna Saunders RN  Outcome: Progressing   The patient's goals for the shift include Eat and move around, safe labor    The clinical goals for the shift include Healthy Mom Healthy Baby    Patient has had  male infant. stable vital signs and assessments, pain well controlled, and bleeding has been appropriate this shift with intervention. Initially declined pit bolus but amenable to bolus during recovery.

## 2024-05-23 NOTE — ANESTHESIA PROCEDURE NOTES
Epidural Block    Patient location during procedure: OB  Start time: 5/23/2024 3:40 PM  End time: 5/23/2024 3:54 PM  Reason for block: labor analgesia  Staffing  Performed: CRNA   Authorized by: KATIE Fowler    Performed by: KATIE Fowler    Preanesthetic Checklist  Completed: patient identified, IV checked, risks and benefits discussed, surgical consent, pre-op evaluation, timeout performed and sterile techniques followed  Block Timeout  RN/Licensed healthcare professional reads aloud to the Anesthesia provider and entire team: Patient identity, procedure with side and site, patient position, and as applicable the availability of implants/special equipment/special requirements.  Patient on coagulant treatment: no  Timeout performed at: 5/23/2024 3:40 PM  Block Placement  Patient position: sitting  Prep: ChloraPrep  Sterility prep: mask, hand, gloves, drape and cap  Sedation level: no sedation  Patient monitoring: heart rate, continuous pulse oximetry and blood pressure  Approach: midline  Local numbing: lidocaine 1% to skin and subcutaneous tissues  Vertebral space: lumbar  Lumbar location: L2-L3  Epidural  Loss of resistance technique: saline  Guidance: landmark technique        Needle  Needle type: Tuohy   Needle gauge: 17  Needle length: 8.9cm  Needle insertion depth: 7 cm  Catheter type: multi-orifice  Catheter size: 20 G  Catheter at skin depth: 11 cm  Catheter securement method: clear occlusive dressing and surgical tape    Test dose: lidocaine 1.5% with epinephrine 1-to-200,000  Test dose: lidocaine 1.5% with epinephrine 1-to-200,000  Test dose result: no positive test dose    PCEA  Medication concentration used: 0.2% Ropivacaine with 2 mcg/mL Fentanyl  Dose (mL): 5  Lockout (minutes): 20  1-Hour Limit (boluses/hr): 3  Basal Rate: 8        Assessment  Sensory level: T4  Block outcome: patient comfortable  Number of attempts: 1  Events: no positive test dose  Procedure assessment:  patient tolerated procedure well with no immediate complications

## 2024-05-23 NOTE — PROGRESS NOTES
"S: Called to patient room for boggy uterus with increased blood loss, firms with massage    O:  /60   Pulse 74   Temp 36.8 °C (98.2 °F) (Oral)   Resp 18   Ht 1.651 m (5' 5\")   Wt 75.6 kg (166 lb 11.4 oz)   LMP 08/18/2023   SpO2 96%   Breastfeeding Yes   BMI 27.74 kg/m²     Pt alert and pleasant sitting upright in bed breastfeeding  Pad and ice pack moderately saturated    A/P:  Postpartum hemorrhage due to uterine atony  Excessive blood loss/ hemorrhage risks discussed with patient including severe anemia, organ damage, need for blood products, decreased breastmilk production.  Recommended Pitocin, pt initially refused.  Recommended straight catheterization or bed pan, pt ultimately refused.  After further discussion of hemorrhage and pitocin, pt in agreement to start IV Pitocin.  Continue to latch infant.  Monitor uterine tone and vaginal bleeding.      "

## 2024-05-24 PROBLEM — Z37.9 NORMAL LABOR (HHS-HCC): Status: RESOLVED | Noted: 2024-05-23 | Resolved: 2024-05-24

## 2024-05-24 PROCEDURE — 2500000001 HC RX 250 WO HCPCS SELF ADMINISTERED DRUGS (ALT 637 FOR MEDICARE OP): Performed by: ADVANCED PRACTICE MIDWIFE

## 2024-05-24 PROCEDURE — 1220000001 HC OB SEMI-PRIVATE ROOM DAILY

## 2024-05-24 RX ORDER — IBUPROFEN 600 MG/1
600 TABLET ORAL EVERY 6 HOURS PRN
Qty: 30 TABLET | Refills: 0 | Status: SHIPPED | OUTPATIENT
Start: 2024-05-24

## 2024-05-24 RX ORDER — ACETAMINOPHEN 500 MG
1000 TABLET ORAL EVERY 6 HOURS PRN
Qty: 30 TABLET | Refills: 0 | Status: SHIPPED | OUTPATIENT
Start: 2024-05-24

## 2024-05-24 RX ADMIN — IBUPROFEN 600 MG: 600 TABLET, FILM COATED ORAL at 13:44

## 2024-05-24 RX ADMIN — ACETAMINOPHEN 975 MG: 325 TABLET ORAL at 13:44

## 2024-05-24 RX ADMIN — ACETAMINOPHEN 975 MG: 325 TABLET ORAL at 21:46

## 2024-05-24 RX ADMIN — IBUPROFEN 600 MG: 600 TABLET, FILM COATED ORAL at 21:46

## 2024-05-24 ASSESSMENT — PAIN - FUNCTIONAL ASSESSMENT
PAIN_FUNCTIONAL_ASSESSMENT: 0-10
PAIN_FUNCTIONAL_ASSESSMENT: 0-10

## 2024-05-24 ASSESSMENT — PAIN SCALES - GENERAL
PAIN_LEVEL: 1
PAINLEVEL_OUTOF10: 0 - NO PAIN
PAINLEVEL_OUTOF10: 4
PAINLEVEL_OUTOF10: 3
PAINLEVEL_OUTOF10: 2
PAINLEVEL_OUTOF10: 0 - NO PAIN

## 2024-05-24 ASSESSMENT — PAIN SCALES - PAIN ASSESSMENT IN ADVANCED DEMENTIA (PAINAD): TOTALSCORE: MEDICATION (SEE MAR)

## 2024-05-24 NOTE — ANESTHESIA POSTPROCEDURE EVALUATION
"Patient: Rubina Branch    Procedure Summary       Date: 05/23/24 Room / Location:     Anesthesia Start: 1540 Anesthesia Stop:     Procedure: Labor Analgesia Diagnosis:     Scheduled Providers:  Responsible Provider: KATIE Fowler    Anesthesia Type: epidural ASA Status: 2            Anesthesia Type: epidural    BP 99/67   Pulse 72   Temp 36.7 °C (98.1 °F) (Oral)   Resp 18   Ht 1.651 m (5' 5\")   Wt 75.6 kg (166 lb 11.4 oz)   LMP 08/18/2023   SpO2 100%   Breastfeeding Yes   BMI 27.74 kg/m²       Anesthesia Post Evaluation    Patient location during evaluation: bedside  Patient participation: complete - patient participated  Level of consciousness: awake and alert  Pain score: 1  Pain management: adequate  Multimodal analgesia pain management approach  Airway patency: patent  Cardiovascular status: acceptable  Respiratory status: acceptable  Hydration status: acceptable  Postoperative Nausea and Vomiting: none  Comments: Epidural catheter removed by nursing. No redness, swelling, or drainage at puncture site.    Complete resolution of numbness. Patient is able to lift legs, bend at the knees, and ambulate.    Patient denies problems with urination.    Patient denies headache or severe back pain.       No notable events documented.    "

## 2024-05-24 NOTE — DISCHARGE SUMMARY
Discharge Summary    Admission Date: 2024  Discharge Date: 24      Discharge Diagnosis  Normal labor (HHS-HCC)    Hospital Course  Delivery Date: 2024  5:21 PM   Delivery type: Vaginal, Spontaneous    GA at delivery: 39w6d  Outcome: Living   Anesthesia during delivery: Epidural   Intrapartum complications: None   Feeding method: Breastfeeding Status: Yes        at term, mild pph 700 controlled with oxytocin.      Does not endorse lightheadedness or palpitations or dyspnea.  Lochia normal.  Desires discharge in am.  Rh pos, rubella immune     Pertinent Physical Exam At Time of Discharge  Visit Vitals  BP 99/67   Pulse 72   Temp 36.7 °C (98.1 °F) (Oral)   Resp 18       Gen: well nourished, NAD  CV: RRR no murmur  Resp: lungs clear to auscultation b/l  Abd: softly distended, nontender no organomegaly  Fundus firm nontender below umbilicus  Ext: no edema, nontender, negative Janeth's        Discharge Meds     Your medication list        START taking these medications        Instructions Last Dose Given Next Dose Due   acetaminophen 500 mg tablet  Commonly known as: Tylenol Extra Strength      Take 2 tablets (1,000 mg) by mouth every 6 hours if needed for mild pain (1 - 3).       ibuprofen 600 mg tablet      Take 1 tablet (600 mg) by mouth every 6 hours if needed for mild pain (1 - 3).              CONTINUE taking these medications        Instructions Last Dose Given Next Dose Due   EPINEPHrine 0.3 mg/0.3 mL injection syringe  Commonly known as: Epipen           prenatal vitamin (iron-folic) 27 mg iron-800 mcg folic acid tablet      Take 1 tablet by mouth once daily.                 Where to Get Your Medications        These medications were sent to [facility] ([facility]) - [place], [place] - [address]  [address], [facility] [address]      Phone: [phone]   acetaminophen 500 mg tablet  ibuprofen 600 mg tablet          Complications Requiring  Follow-Up  None, fu 6 weeks    Test Results Pending At Discharge  Pending Labs       No current pending labs.            Outpatient Follow-Up  Future Appointments   Date Time Provider Department Center   7/15/2024  2:00 PM Gerda Monson MD [id number] Dieter PELAEZ spent 30 minutes in the professional and overall care of this patient.      Osiris Lynn MD

## 2024-05-24 NOTE — CARE PLAN
Problem: Postpartum  Goal: Experiences normal postpartum course  Outcome: Progressing  Goal: Appropriate maternal -  bonding  Outcome: Progressing  Goal: Establish and maintain infant feeding pattern for adequate nutrition  Outcome: Progressing  Goal: Incisions, wounds, or drain sites healing without S/S of infection  Outcome: Progressing  Goal: No s/sx infection  Outcome: Progressing  Goal: No s/sx of hemorrhage  Outcome: Progressing  Goal: Minimal s/sx of HDP and BP<160/110  Outcome: Progressing     Problem: Postpartum hemorrhage  Goal: Hemodynamic stability and limit blood loss  Outcome: Progressing     Problem: Pain - Adult  Goal: Verbalizes/displays adequate comfort level or baseline comfort level  Outcome: Progressing     Problem: Safety - Adult  Goal: Free from fall injury  Outcome: Progressing     Problem: Discharge Planning  Goal: Discharge to home or other facility with appropriate resources  Outcome: Progressing

## 2024-05-25 VITALS
OXYGEN SATURATION: 100 % | RESPIRATION RATE: 16 BRPM | HEIGHT: 65 IN | TEMPERATURE: 97.9 F | HEART RATE: 60 BPM | WEIGHT: 166.71 LBS | DIASTOLIC BLOOD PRESSURE: 65 MMHG | SYSTOLIC BLOOD PRESSURE: 118 MMHG | BODY MASS INDEX: 27.78 KG/M2

## 2024-05-25 ASSESSMENT — PAIN SCALES - GENERAL
PAINLEVEL_OUTOF10: 0 - NO PAIN
PAINLEVEL_OUTOF10: 0 - NO PAIN

## 2024-05-25 NOTE — DISCHARGE SUMMARY
Discharge Summary    Admission Date: 5/23/2024  Discharge Date: 5/25/24    Discharge Diagnosis  Normal labor ([facility]HS-HCC)    Hospital Course  Delivery Date: 5/23/2024  5:21 PM   Delivery type: Vaginal, Spontaneous    GA at delivery: 39w6d  Outcome: Living   Anesthesia during delivery: Epidural   Intrapartum complications: None   Feeding method: Breastfeeding Status: Yes     Procedures:  vaginal delivery  Contraception at discharge: none  Doing well, breastfeeding, bleeding minimal, pain controlled with PO meds.    Pertinent Physical Exam At Time of Discharge    General: Examination reveals a well developed, well nourished, female, in no acute distress. She is alert and cooperative.  Fundus: firm and nontender.  Extremities: no redness or tenderness in the calves or thighs, no edema.  Neurological: DTRs normal and symmetrical.  Psychological: awake and alert; oriented to person, place, and time.    Discharge Meds     Your medication list        START taking these medications        Instructions Last Dose Given Next Dose Due   acetaminophen 500 mg tablet  Commonly known as: Tylenol Extra Strength      Take 2 tablets (1,000 mg) by mouth every 6 hours if needed for mild pain (1 - 3).       ibuprofen 600 mg tablet      Take 1 tablet (600 mg) by mouth every 6 hours if needed for mild pain (1 - 3).              CONTINUE taking these medications        Instructions Last Dose Given Next Dose Due   EPINEPHrine 0.3 mg/0.3 mL injection syringe  Commonly known as: Epipen           prenatal vitamin (iron-folic) 27 mg iron-800 mcg folic acid tablet      Take 1 tablet by mouth once daily.                 Where to Get Your Medications        These medications were sent to [facility] ([facility]) - [place], [place] - [address]  [address], [facility] [address]      Phone: [phone]   acetaminophen 500 mg tablet  ibuprofen 600 mg tablet          Complications Requiring  Follow-Up  none    Test Results Pending At Discharge  Pending Labs       No current pending labs.            Outpatient Follow-Up  Future Appointments   Date Time Provider Department Center   7/15/2024  2:00 PM Gerda Monson MD [id number] Dieter PELAEZ spent 30 minutes in the professional and overall care of this patient.      Carlee Miranda, DO

## 2024-05-25 NOTE — LACTATION NOTE
"Lactation Consultant Note  Lactation Consultation       Maternal Information       Maternal Assessment       Infant Assessment       Feeding Assessment       LATCH TOOL       Breast Pump       Other OB Lactation Tools       Patient Follow-up       Other OB Lactation Documentation       Recommendations/Summary  RN in room during shift several times, Working with patient, nursing staff, pediatrician. Concerns over mother nipple pain/positioning/NB suck by RN LC staff, NB newly circumcised, RN staff encouraging mother to attempt to feed NB every 2-3hours. Patient desires LC consult at times and then denies need at times and considering discharge at 24hours but considering staying to work on NB latch.  Mother is 27yo   24 at 1731 of viable boy \"Shane\" at 39.6weeks gestation. Mother was hemorrhage . Mother has laceration. NB NW 3290, BW 24 at [phone]. NB circumcised \"this afternoon around 1730 per mother and states NB sleepy since. Mother expressed to RN staff and LC staff she does not want to wake NB to feed at this time she feels he needs to rest. Rn reviewed normal nb feeding and need to feed at least every 2-3hours. Mother has states that NB fed for 5hours and slept for 5hours and she is ok if that is his feeding pattern. RN discussed how feeding going today. Mother states better and that not using nipple shield - \"that is did help discomfort but did not take it totally away.\" Rn discussed concerns of disorganized suck, and possible posterior TOT, reviewed out patient resource sheet for TOT and for 1:1 LC support.  Parentd had several questions regarding NB, concerns of TOT with 3yo daughter at home. Mother explains that she had nipple pain and bleeding for 6 weeks and that unable to get answers from pediatrician or OB. Rn again expressed importance of follow up with LC and to continue to frequently feed NB and to work on suck. RN again offered pumping. Mother states she will not pump. " "RN assessed nipples as mother states they are still painful, still reddened and bruising on right nipple, left nipple still red, blister not noted at this time. Mother wearing nursing bra is not using lanolin, colostrum, therashells or gel pads from yesterday consult at this time.  Pediatrician to in room later. Patient agreeable for LC to come in an attempt hand pump and HE to protect milk supply and feed sleeping NB. Rn in room showed patient how to assemble and use Hand Pump. Patient tried for 10seconds, and took pump off states it hurts and is not doing much. Patient agreeable to Rn attemping to HE. Patient sates she cannot get anything to come out. Rn reviewed warming up and proper HE technique able to get colostrum to flow from right nipple to smith cup. Mother states HE painful but ok if she does not look.  states that if she did not have to see nipple in pump then pumping would work. RN continues to do HE and get large drop, feed to NB with gloved finger. Mother is done with HE. RN offers other forms of supplementation like DHM if mother does not want to latch, HE or pump. Mother refusing DHM and taked \"nb slept for 5 hours before and nursed for 5hours and that is her plan.\"  Patient states to nursing staff that she will stay overnight to get assitance with latching.  Later in shift mother sending NB out of room to sleep. At 0400 has not called LC for additional assistance  "

## 2024-05-25 NOTE — CARE PLAN
Problem: Postpartum  Goal: Experiences normal postpartum course  Outcome: Met  Goal: Appropriate maternal -  bonding  Outcome: Met  Goal: Establish and maintain infant feeding pattern for adequate nutrition  Outcome: Met  Goal: Incisions, wounds, or drain sites healing without S/S of infection  Outcome: Met  Goal: No s/sx infection  Outcome: Met  Goal: No s/sx of hemorrhage  Outcome: Met  Goal: Minimal s/sx of HDP and BP<160/110  Outcome: Met     Problem: Postpartum hemorrhage  Goal: Hemodynamic stability and limit blood loss  Outcome: Met     Problem: Pain - Adult  Goal: Verbalizes/displays adequate comfort level or baseline comfort level  Outcome: Met     Problem: Safety - Adult  Goal: Free from fall injury  Outcome: Met     Problem: Discharge Planning  Goal: Discharge to home or other facility with appropriate resources  Outcome: Met

## 2024-05-29 ENCOUNTER — APPOINTMENT (OUTPATIENT)
Dept: OBSTETRICS AND GYNECOLOGY | Facility: CLINIC | Age: 29
End: 2024-05-29
Payer: MEDICAID

## 2024-06-03 ENCOUNTER — APPOINTMENT (OUTPATIENT)
Dept: OBSTETRICS AND GYNECOLOGY | Facility: CLINIC | Age: 29
End: 2024-06-03
Payer: MEDICAID

## 2024-06-28 ENCOUNTER — TELEPHONE (OUTPATIENT)
Dept: OBSTETRICS AND GYNECOLOGY | Facility: CLINIC | Age: 29
End: 2024-06-28

## 2024-06-28 DIAGNOSIS — N61.0 MASTITIS, LEFT, ACUTE: Primary | ICD-10-CM

## 2024-06-28 RX ORDER — CEPHALEXIN 500 MG/1
500 CAPSULE ORAL 3 TIMES DAILY
Qty: 30 CAPSULE | Refills: 0 | Status: SHIPPED | OUTPATIENT
Start: 2024-06-28 | End: 2024-07-08

## 2024-06-28 NOTE — TELEPHONE ENCOUNTER
Postpartum patient left message on ob line c/o mastitis concerns.  The last time she had mastitis she had 3 days of fevers then she developed the red, hot knots on her breasts.  Patient started spiking fevers again yesterday around 4 pm and is concerned that she has developed mastitis again.  Patient would like to know if she could have an antibiotic sent to her pharmacy now, instead of waiting for the painful red knots to form in her breasts.    Patient normally sees Dr Monson, who is not in the office on Fridays.      Please advise

## 2024-07-01 ENCOUNTER — TELEPHONE (OUTPATIENT)
Dept: OBSTETRICS AND GYNECOLOGY | Facility: CLINIC | Age: 29
End: 2024-07-01

## 2024-07-01 ENCOUNTER — APPOINTMENT (OUTPATIENT)
Dept: OBSTETRICS AND GYNECOLOGY | Facility: CLINIC | Age: 29
End: 2024-07-01
Payer: MEDICAID

## 2024-07-01 NOTE — TELEPHONE ENCOUNTER
Spoke to the patient today regarding her possible Mastitis. Dr. Miranda rx'd antibiotics for her last week. Patient states that her fever and redness in the breast is gone. Patient did not feel the need to keep today's appointment but will keep next week's PPV.

## 2024-07-15 ENCOUNTER — APPOINTMENT (OUTPATIENT)
Dept: OBSTETRICS AND GYNECOLOGY | Facility: CLINIC | Age: 29
End: 2024-07-15
Payer: MEDICAID

## 2024-07-15 VITALS
BODY MASS INDEX: 24.22 KG/M2 | WEIGHT: 145.4 LBS | DIASTOLIC BLOOD PRESSURE: 60 MMHG | HEIGHT: 65 IN | SYSTOLIC BLOOD PRESSURE: 104 MMHG

## 2024-07-15 DIAGNOSIS — Z01.419 WELL WOMAN EXAM WITH ROUTINE GYNECOLOGICAL EXAM: Primary | ICD-10-CM

## 2024-07-15 PROBLEM — Z34.90 PRENATAL CARE, ANTEPARTUM (HHS-HCC): Status: RESOLVED | Noted: 2024-03-11 | Resolved: 2024-07-15

## 2024-07-15 PROCEDURE — 87624 HPV HI-RISK TYP POOLED RSLT: CPT

## 2024-07-15 RX ORDER — FOLIC ACID/MULTIVIT,IRON,MINER 0.4MG-18MG
1 TABLET ORAL DAILY
Qty: 90 TABLET | Refills: 3 | Status: SHIPPED | OUTPATIENT
Start: 2024-07-15

## 2024-07-15 ASSESSMENT — EDINBURGH POSTNATAL DEPRESSION SCALE (EPDS)
I HAVE BEEN ANXIOUS OR WORRIED FOR NO GOOD REASON: NO, NOT AT ALL
THE THOUGHT OF HARMING MYSELF HAS OCCURRED TO ME: NEVER
I HAVE BEEN SO UNHAPPY THAT I HAVE BEEN CRYING: NO, NEVER
I HAVE FELT SAD OR MISERABLE: NO, NOT AT ALL
I HAVE BEEN SO UNHAPPY THAT I HAVE HAD DIFFICULTY SLEEPING: NOT AT ALL
THINGS HAVE BEEN GETTING ON TOP OF ME: NO, I HAVE BEEN COPING AS WELL AS EVER
I HAVE BLAMED MYSELF UNNECESSARILY WHEN THINGS WENT WRONG: NOT VERY OFTEN
TOTAL SCORE: 2
I HAVE LOOKED FORWARD WITH ENJOYMENT TO THINGS: AS MUCH AS I EVER DID
I HAVE FELT SCARED OR PANICKY FOR NO GOOD REASON: NO, NOT MUCH
I HAVE BEEN ABLE TO LAUGH AND SEE THE FUNNY SIDE OF THINGS: AS MUCH AS I ALWAYS COULD

## 2024-07-15 NOTE — PROGRESS NOTES
Subjective   Patient ID: Rubina Branch is a 29 y.o. female who presents for Postpartum Care. 5/23/24 vaginal delivery at [facility] by Dr. Sharon Martinez. Baby boy, John, 7lb 4oz. Last pap 9/9/21 normal HPV-  HPI    Subjective:  . Her pregnancy and delivery were complicated by none .    Doing well at home. She has minimal vaginal bleeding, normal bowel and bladder function. Good appetite with normal diet.   Moods are good.  She feels well-supported at home. She is currently breast feeding. Baby is doing well.      Review of Systems: Negative except as noted above.  Objective:  Vitals:    07/15/24 1422   BP: 104/60       Neuro: alert and oriented  Psych: appropriate affect   Breasts: no massses, nontender. No infection or mastitis.   Abdomen: soft, flat, nontender. Incision :   none    Pelvic: no vulvarlesions. Perineum intact.   Cervix normal. No discharge or odor. Uterus : small, nontender. No adnexal masses.   Extremities: no edema, nontender.    Assessment and Plan:    6 week postpartum visit, doing well.  Hx of Gestational diabetes no  Hx of GHTN no  -Contraception: condoms   -Pap: today     -Postpartum care: may resume normal activities including exercise, work, intercourse  Follow up : Return for annual examination or sooner as needed      Assessment/Plan   Diagnoses and all orders for this visit:  Well woman exam with routine gynecological exam  -     THINPREP PAP  Postpartum exam ([facility]-H[facility])  -     prenatal vitamin, iron-folic, (PNV cmb#95-ferrous fumarate-FA) 27 mg iron-800 mcg folic acid tablet; Take 1 tablet by mouth once daily.  MD Barbra Dominguez, [facility] 07/15/24 2:06 PM

## 2024-07-26 LAB
CYTOLOGY CMNT CVX/VAG CYTO-IMP: NORMAL
HPV HR 12 DNA GENITAL QL NAA+PROBE: NEGATIVE
HPV HR GENOTYPES PNL CVX NAA+PROBE: NEGATIVE
HPV16 DNA SPEC QL NAA+PROBE: NEGATIVE
HPV18 DNA SPEC QL NAA+PROBE: NEGATIVE
LAB AP HPV GENOTYPE QUESTION: YES
LAB AP HPV HR: NORMAL
LABORATORY COMMENT REPORT: NORMAL
PATH REPORT.TOTAL CANCER: NORMAL

## 2024-10-29 ENCOUNTER — TELEPHONE (OUTPATIENT)
Dept: OBSTETRICS AND GYNECOLOGY | Facility: CLINIC | Age: 29
End: 2024-10-29

## 2024-10-29 ENCOUNTER — APPOINTMENT (OUTPATIENT)
Dept: OBSTETRICS AND GYNECOLOGY | Facility: CLINIC | Age: 29
End: 2024-10-29
Payer: MEDICAID

## 2024-10-29 RX ORDER — DICLOXACILLIN SODIUM 500 MG/1
500 CAPSULE ORAL 4 TIMES DAILY
Qty: 40 CAPSULE | Refills: 0 | Status: SHIPPED | OUTPATIENT
Start: 2024-10-29 | End: 2024-11-08

## 2024-10-29 NOTE — TELEPHONE ENCOUNTER
Patient called ob line c/o right breast pain, chills, fever and body aches.  She is currently breast feeding, so far has not noticed any hard knots, redness or even warm areas on breast.  Patient states she has had mastitis in the past and thinks she may have it again.    Patient offered to see [facility] in [place] but she declined due to distance.    Patient agreed to come in to [place] and see Dr Rajput as she does prefer to see MD.  Normally sees Dr Monson

## 2025-02-24 ENCOUNTER — TELEPHONE (OUTPATIENT)
Dept: OBSTETRICS AND GYNECOLOGY | Facility: HOSPITAL | Age: 30
End: 2025-02-24
Payer: MEDICAID

## 2025-02-24 DIAGNOSIS — N61.0 MASTITIS: Primary | ICD-10-CM

## 2025-02-24 RX ORDER — DICLOXACILLIN SODIUM 500 MG/1
500 CAPSULE ORAL 4 TIMES DAILY
Qty: 40 CAPSULE | Refills: 0 | Status: SHIPPED | OUTPATIENT
Start: 2025-02-24 | End: 2025-03-06

## 2025-02-24 NOTE — TELEPHONE ENCOUNTER
Patient paged emergency line with concern for mastitis. She is 9 months postpartum and has had mastitis before. Right breast is painful, not yet erythematous, and she reports chills at home. Tmax 99.5 and then took ibuprofen. Discussed concern for developing mastitis and recommend initiating antibiotics now. Pt reluctant to start antibiotics but if not improved by AM would then be willing to start. Reviewed risks of worsening infection and breast abscess with untreated mastitis. Amenable to Rx for dicloxacillin which was sent to her pharmacy. Will schedule office follow-up in 2-3 days. Discussed heat/cold compresses, NSAIDs, gentle massage, and continuing to breastfeed starting on the affect side. All questions answered, comfortable with plan.     Juanita Eden MD

## 2025-02-27 ENCOUNTER — APPOINTMENT (OUTPATIENT)
Facility: CLINIC | Age: 30
End: 2025-02-27
Payer: MEDICAID

## 2025-06-03 ENCOUNTER — HOSPITAL ENCOUNTER (EMERGENCY)
Facility: HOSPITAL | Age: 30
Discharge: HOME | End: 2025-06-03
Payer: MEDICAID

## 2025-06-03 VITALS
WEIGHT: 130 LBS | BODY MASS INDEX: 21.66 KG/M2 | TEMPERATURE: 96.8 F | SYSTOLIC BLOOD PRESSURE: 110 MMHG | HEIGHT: 65 IN | DIASTOLIC BLOOD PRESSURE: 73 MMHG | RESPIRATION RATE: 18 BRPM | OXYGEN SATURATION: 100 % | HEART RATE: 78 BPM

## 2025-06-03 DIAGNOSIS — S61.213A LACERATION OF LEFT MIDDLE FINGER WITHOUT FOREIGN BODY WITHOUT DAMAGE TO NAIL, INITIAL ENCOUNTER: Primary | ICD-10-CM

## 2025-06-03 PROCEDURE — 12001 RPR S/N/AX/GEN/TRNK 2.5CM/<: CPT | Performed by: PHYSICIAN ASSISTANT

## 2025-06-03 PROCEDURE — 99282 EMERGENCY DEPT VISIT SF MDM: CPT

## 2025-06-03 PROCEDURE — 99283 EMERGENCY DEPT VISIT LOW MDM: CPT | Performed by: PHYSICIAN ASSISTANT

## 2025-06-03 ASSESSMENT — LIFESTYLE VARIABLES
HAVE PEOPLE ANNOYED YOU BY CRITICIZING YOUR DRINKING: NO
EVER FELT BAD OR GUILTY ABOUT YOUR DRINKING: NO
EVER HAD A DRINK FIRST THING IN THE MORNING TO STEADY YOUR NERVES TO GET RID OF A HANGOVER: NO
TOTAL SCORE: 0
HAVE YOU EVER FELT YOU SHOULD CUT DOWN ON YOUR DRINKING: NO

## 2025-06-03 ASSESSMENT — PAIN DESCRIPTION - ORIENTATION: ORIENTATION: RIGHT

## 2025-06-03 ASSESSMENT — PAIN SCALES - GENERAL: PAINLEVEL_OUTOF10: 6

## 2025-06-03 ASSESSMENT — PAIN DESCRIPTION - LOCATION: LOCATION: HAND

## 2025-06-03 ASSESSMENT — PAIN - FUNCTIONAL ASSESSMENT: PAIN_FUNCTIONAL_ASSESSMENT: 0-10

## 2025-06-03 ASSESSMENT — COLUMBIA-SUICIDE SEVERITY RATING SCALE - C-SSRS
6. HAVE YOU EVER DONE ANYTHING, STARTED TO DO ANYTHING, OR PREPARED TO DO ANYTHING TO END YOUR LIFE?: NO
1. IN THE PAST MONTH, HAVE YOU WISHED YOU WERE DEAD OR WISHED YOU COULD GO TO SLEEP AND NOT WAKE UP?: NO
2. HAVE YOU ACTUALLY HAD ANY THOUGHTS OF KILLING YOURSELF?: NO

## 2025-06-03 NOTE — ED PROVIDER NOTES
Emergency Department Provider Note        History of Present Illness     History provided by: Patient  Limitations to History: None  External Records Reviewed with Brief Summary: None    HPI:  Rubina Branch is a 30 y.o. female who is previously healthy, had a tetanus shot 3 years ago who presents today for evaluation of a left middle finger laceration, injury occurred about an hour ago at 11:30 AM, patient states that she was using hedge trimmers and started to drop them, states that when she tried to catch them she accidentally lacerated her finger.  Patient denies any possibility for foreign body or foreign body sensation, denies any numbness tingling distally, she is distally neurovascularly intact.  Denies any other injuries.    Physical Exam   Triage vitals:  T 36 °C (96.8 °F)  HR 78  /73  RR 18  O2 100 % None (Room air)    Physical Exam  Vitals and nursing note reviewed.   Constitutional:       General: She is not in acute distress.     Appearance: Normal appearance. She is not toxic-appearing.   HENT:      Head: Normocephalic and atraumatic.      Nose: Nose normal.   Eyes:      Extraocular Movements: Extraocular movements intact.   Cardiovascular:      Rate and Rhythm: Normal rate and regular rhythm.   Pulmonary:      Effort: Pulmonary effort is normal.   Abdominal:      Palpations: Abdomen is soft.   Musculoskeletal:         General: Normal range of motion.      Cervical back: Normal range of motion and neck supple.      Comments: Left middle finger with laceration to the palmar radial aspect, brisk cap refill distally, normal sensation distally, full range of motion with flexion extension at the MCP, PIP and DIP including against resistance.  No active bleeding, no exposed tendon.   Skin:     General: Skin is warm and dry.   Neurological:      General: No focal deficit present.      Mental Status: She is alert.   Psychiatric:         Mood and Affect: Mood normal.         Thought Content: Thought  content normal.          Medical Decision Making & ED Course   Medical Decision Makin y.o. female presents today for evaluation of left middle finger laceration, see above HPI and physical exam.  I discussed with patient my recommendation for stitches given that it is in a high tension area however patient would really like to avoid stitches and is asking if we can glue it, I discussed with her the risks of this which include poor wound approximation, wound pulling apart given that it is in a high tension area, poor cosmetic outcome and larger scar.  Also discussed general risks including infection.  Patient expressed understanding however she would like to proceed with glue.  Laceration repair was performed by my PA student under supervision, area was cleansed with Betadine, extensively irrigated using sterile saline and a 60 cc syringe under pressure, 2 layers of skin glue were used to approximate the laceration, Steri-Strips were applied to provide extra support and then patient was placed in a finger splint secured with Coban to prevent movement of the finger to minimize pulling the wound apart.  Patient is aware of signs and symptoms of infection including warmth, redness, swelling, drainage or purulence that would warrant return to the emergency department.  Patient is stable for discharge at this time.  ----      Differential diagnoses considered include but are not limited to: Laceration, retained foreign body, tendon injury, neurovascular compromise, etc.     Social Determinants of Health which Significantly Impact Care: None identified     EKG Independent Interpretation: EKG not obtained    Independent Result Review and Interpretation: None obtained    Chronic conditions affecting the patient's care: As documented above in [facility]    The patient was discussed with the following consultants/services: None    Care Considerations: As documented above in [facility]    ED Course:  Diagnoses as of 25 1310    Laceration of left middle finger without foreign body without damage to nail, initial encounter     Disposition   As a result of the work-up, the patient was discharged home.  she was informed of her diagnosis and instructed to come back with any concerns or worsening of condition.  she and was agreeable to the plan as discussed above.  she was given the opportunity to ask questions.  All of the patient's questions were answered.    Procedures   Laceration Repair    Performed by: Liliana Cee PA-C  Authorized by: Liliana Cee PA-C    Consent:     Consent obtained:  Verbal    Consent given by:  Patient    Risks, benefits, and alternatives were discussed: yes      Risks discussed:  Infection, need for additional repair, poor wound healing, poor cosmetic result and pain    Alternatives discussed:  No treatment  Universal protocol:     Patient identity confirmed:  Verbally with patient  Laceration details:     Location:  Finger    Finger location:  L long finger    Length (cm):  1.5  Pre-procedure details:     Preparation:  Patient was prepped and draped in usual sterile fashion  Exploration:     Hemostasis achieved with:  Direct pressure    Wound exploration: wound explored through full range of motion and entire depth of wound visualized      Wound extent: areolar tissue not violated, fascia not violated, no foreign body, no signs of injury, no nerve damage, no tendon damage, no underlying fracture and no vascular damage    Treatment:     Area cleansed with:  Povidone-iodine    Amount of cleaning:  Standard    Irrigation solution:  Sterile saline    Irrigation volume:  500 cc    Irrigation method:  Syringe and pressure wash    Visualized foreign bodies/material removed: no    Skin repair:     Repair method:  Tissue adhesive and Steri-Strips  Approximation:     Approximation:  Close  Repair type:     Repair type:  Simple  Post-procedure details:     Dressing:  Splint for protection    Procedure completion:   Tolerated well, no immediate complications      Patient was seen independently    Liliana Cee PA-C  Emergency Medicine       Liliana Cee PA-C  06/03/25 [id number]

## 2025-06-03 NOTE — DISCHARGE INSTRUCTIONS
Monitor for any warmth, redness, swelling, purulence or discharge as these can indicate infection, return to the ER if this were to occur.  Keep splint on to prevent the laceration from falling apart.

## 2025-06-19 ENCOUNTER — HOSPITAL ENCOUNTER (EMERGENCY)
Facility: HOSPITAL | Age: 30
Discharge: HOME | End: 2025-06-19
Attending: EMERGENCY MEDICINE
Payer: MEDICAID

## 2025-06-19 VITALS
DIASTOLIC BLOOD PRESSURE: 65 MMHG | TEMPERATURE: 97.9 F | WEIGHT: 132 LBS | SYSTOLIC BLOOD PRESSURE: 114 MMHG | OXYGEN SATURATION: 98 % | BODY MASS INDEX: 21.21 KG/M2 | RESPIRATION RATE: 16 BRPM | HEIGHT: 66 IN | HEART RATE: 84 BPM

## 2025-06-19 DIAGNOSIS — S81.812A LACERATION OF LEFT LOWER EXTREMITY, INITIAL ENCOUNTER: Primary | ICD-10-CM

## 2025-06-19 PROCEDURE — 99284 EMERGENCY DEPT VISIT MOD MDM: CPT | Performed by: EMERGENCY MEDICINE

## 2025-06-19 PROCEDURE — 99283 EMERGENCY DEPT VISIT LOW MDM: CPT | Mod: 25

## 2025-06-19 PROCEDURE — 2500000005 HC RX 250 GENERAL PHARMACY W/O HCPCS

## 2025-06-19 PROCEDURE — 12002 RPR S/N/AX/GEN/TRNK2.6-7.5CM: CPT

## 2025-06-19 PROCEDURE — 2500000001 HC RX 250 WO HCPCS SELF ADMINISTERED DRUGS (ALT 637 FOR MEDICARE OP): Performed by: EMERGENCY MEDICINE

## 2025-06-19 PROCEDURE — 2500000004 HC RX 250 GENERAL PHARMACY W/ HCPCS (ALT 636 FOR OP/ED)

## 2025-06-19 PROCEDURE — 12032 INTMD RPR S/A/T/EXT 2.6-7.5: CPT

## 2025-06-19 PROCEDURE — 99282 EMERGENCY DEPT VISIT SF MDM: CPT | Performed by: EMERGENCY MEDICINE

## 2025-06-19 PROCEDURE — 12002 RPR S/N/AX/GEN/TRNK2.6-7.5CM: CPT | Performed by: EMERGENCY MEDICINE

## 2025-06-19 RX ORDER — CEPHALEXIN 500 MG/1
500 CAPSULE ORAL ONCE
Status: COMPLETED | OUTPATIENT
Start: 2025-06-19 | End: 2025-06-19

## 2025-06-19 RX ORDER — LIDOCAINE HYDROCHLORIDE 10 MG/ML
10 INJECTION, SOLUTION INFILTRATION; PERINEURAL ONCE
Status: COMPLETED | OUTPATIENT
Start: 2025-06-19 | End: 2025-06-19

## 2025-06-19 RX ORDER — BACITRACIN ZINC 500 UNIT/G
OINTMENT IN PACKET (EA) TOPICAL
Status: COMPLETED
Start: 2025-06-19 | End: 2025-06-19

## 2025-06-19 RX ORDER — CEPHALEXIN 500 MG/1
500 CAPSULE ORAL 3 TIMES DAILY
Qty: 21 CAPSULE | Refills: 0 | Status: SHIPPED | OUTPATIENT
Start: 2025-06-19 | End: 2025-06-26

## 2025-06-19 RX ORDER — LIDOCAINE HYDROCHLORIDE 10 MG/ML
INJECTION, SOLUTION INFILTRATION; PERINEURAL
Status: COMPLETED
Start: 2025-06-19 | End: 2025-06-19

## 2025-06-19 RX ADMIN — Medication 1 APPLICATION: at 21:31

## 2025-06-19 RX ADMIN — LIDOCAINE HYDROCHLORIDE 10 ML: 10 INJECTION, SOLUTION INFILTRATION; PERINEURAL at 23:28

## 2025-06-19 RX ADMIN — BACITRACIN 1 APPLICATION: 500 OINTMENT TOPICAL at 23:25

## 2025-06-19 RX ADMIN — CEPHALEXIN 500 MG: 500 CAPSULE ORAL at 23:27

## 2025-06-19 ASSESSMENT — PAIN DESCRIPTION - DESCRIPTORS: DESCRIPTORS: ACHING

## 2025-06-19 ASSESSMENT — PAIN - FUNCTIONAL ASSESSMENT
PAIN_FUNCTIONAL_ASSESSMENT: 0-10
PAIN_FUNCTIONAL_ASSESSMENT: 0-10

## 2025-06-19 ASSESSMENT — PAIN SCALES - GENERAL
PAINLEVEL_OUTOF10: 1
PAINLEVEL_OUTOF10: 1

## 2025-06-19 ASSESSMENT — PAIN DESCRIPTION - LOCATION: LOCATION: OTHER (COMMENT)

## 2025-06-20 NOTE — DISCHARGE INSTRUCTIONS
Please note that you are to refrain from swimming, or being in any ponds, lakes or rivers as there is a high probability of wound contamination.  We also recommend that when you go to the restroom, you wash the area with soap and water afterwards, to prevent contamination to the wound.  Please note that the sutures that were placed can stick to clothing, and bandages therefore he should be very careful with removing bandages and clothing as you can rip out a stitch doing so if you go too fast.  We recommend no sexual activity until the wound is healed.  Please follow-up with your primary care doctor in 1 week, or with the wound care clinic provided in the next week and have the sutures evaluated for removal in the next 12 to 14 days.    You are to return to the emergency room for any increasing pain, swelling or redness or any drainage from the wound or fevers and chills as these may be signs of wound infection.  Please take the antibiotics as directed otherwise.

## 2025-06-20 NOTE — ED PROCEDURE NOTE
Procedure  Laceration Repair    Performed by: Courtney Mccoy MD  Authorized by: Ismael Johnson DO    Consent:     Consent obtained:  Verbal    Consent given by:  Patient    Risks, benefits, and alternatives were discussed: yes      Risks discussed:  Infection and nerve damage    Alternatives discussed:  No treatment and delayed treatment  Universal protocol:     Patient identity confirmed:  Verbally with patient, arm band, provided demographic data and hospital-assigned identification number  Anesthesia:     Anesthesia method:  Local infiltration and topical application    Topical anesthetic:  LET    Local anesthetic:  Lidocaine 1% w/o epi  Laceration details:     Location:  Pelvis    Pelvis location:  Groin    Length (cm):  3    Depth (mm):  4  Pre-procedure details:     Preparation:  Patient was prepped and draped in usual sterile fashion and imaging obtained to evaluate for foreign bodies  Exploration:     Limited defect created (wound extended): no      Imaging outcome: foreign body not noted      Wound exploration: wound explored through full range of motion and entire depth of wound visualized      Wound extent: fascia not violated, no foreign body, no signs of injury, no tendon damage, no underlying fracture and no vascular damage      Contaminated: no    Treatment:     Area cleansed with:  Saline and povidone-iodine    Amount of cleaning:  Standard    Irrigation solution:  Sterile saline    Irrigation method:  Syringe    Visualized foreign bodies/material removed: no      Debridement:  None    Undermining:  None    Scar revision: no    Skin repair:     Repair method:  Sutures    Suture size:  4-0    Suture material:  Nylon    Suture technique:  Vertical mattress and simple interrupted    Number of sutures:  4  Approximation:     Approximation:  Close  Repair type:     Repair type:  Simple  Post-procedure details:     Dressing:  Antibiotic ointment, non-adherent dressing and sterile dressing     Procedure completion:  Jack Mccoy MD  Resident  06/20/25 0025      I was present for the entirety of the procedure(s).                                                             Ismael Johnson,   06/21/25 [id number]

## 2025-06-20 NOTE — ED PROVIDER NOTES
Emergency Department Provider Note        History of Present Illness     History provided by: Patient  Limitations to History: None  External Records Reviewed with Brief Summary: None    HPI:  Rubina Branch is a 30-year-old female with a past medical history of asthma, allergic rhinitis, and esophageal dysphagia presents to the ED with a complaint of a groin laceration. The patient reports that the injury occurred while she was stepping over a barrier at home, during which she landed on the end of a protruding piece of metal. This resulted in a 2.5 cm laceration to the inner crease of her left thigh. She states that she did not immediately realize she was injured; it was her  who noticed the bleeding. The bleeding was initially active but has since been controlled. The patient denies any associated fall or head trauma. She also denies numbness, weakness, or tingling in the left lower extremity. Review of systems is negative for fever, headache, dizziness, lightheadedness, shortness of breath, chest pain, abdominal pain, diarrhea, or urinary symptoms. She denies tobacco, alcohol, or drug use.    Physical Exam   Triage vitals:  T 36.2 °C (97.2 °F)  HR 62  /56  RR 18  O2 95 % None (Room air)    General: Awake, alert, in no acute distress  Eyes: Gaze conjugate.  No scleral icterus or injection  HENT: Normo-cephalic, atraumatic. No stridor  CV: Regular rate, regular rhythm. Radial pulses 2+ bilaterally  Resp: Breathing non-labored, speaking in full sentences.  Clear to auscultation bilaterally  GI: Soft, non-distended, non-tender. No rebound or guarding.  : Not examined.  MSK/Extremities: No gross bony deformities. Moving all extremities. There is 2.5 cm laceration to the left lower groin.  Skin: Warm. Appropriate color  Neuro: Alert. Oriented. Face symmetric. Speech is fluent.  Gross strength and sensation intact in b/l UE and LEs  Psych: Appropriate mood and affect    Medical Decision Making & ED  Course   Medical Decision Makin y.o. female  30-year-old female presented to the ED with a 2.5 cm laceration to the inner left thigh following an injury from stepping over a barrier and landing on a protruding metal object. The differential diagnosis included simple soft tissue laceration, vascular injury, and possible foreign body retention. On examination, the wound was approximately 0.5 cm deep, without evidence of active bleeding, neurovascular compromise, involvement of fascia or muscle belly, or signs of systemic infection. Given the well-localized nature of the injury and absence of deeper structural involvement, no imaging or laboratory workup was necessary.    The laceration site was  prepped cleaning with sterile saline and betadine.  Anesthetized initially with topical LET, followed by local infiltration of lidocaine. The wound was thoroughly cleansed with betadine and irrigated with normal saline. After exploration confirmed no foreign body or structural involvement, the laceration was repaired using 4-0 Ethilon sutures, including one horizontal mattress and three simple interrupted sutures. Topical bacitracin was applied, and the wound was dressed with a non-adherent layer followed by a large Tegaderm. The patient tolerated the procedure well. Prophylactic antibiotics were given with 500 mg of Keflex.    The case was discussed with the ED attending, Dr. Johnson, who evaluated the patient at the bedside. The patient remained hemodynamically stable throughout her ED course. She was discharged in stable condition with detailed wound care instructions, return precautions, and outpatient follow-up for suture removal and infection monitoring.    She was given a prescription of Keflex to complete out patient treatment, and referral to wound care clinic.  ----      Differential diagnoses considered include but are not limited to: Simple soft tissue laceration, vascular injury, and possible foreign body  retention.     Social Determinants of Health which Significantly Impact Care: None identified     EKG Independent Interpretation: EKG not obtained    Independent Result Review and Interpretation: None obtained    Chronic conditions affecting the patient's care: None    The patient was discussed with the following consultants/services: None    Care Considerations: As documented above in [facility]    ED Course:  Diagnoses as of 06/22/25 1422   Laceration of left lower extremity, initial encounter     Disposition   As a result of the work-up, the patient was discharged home.  she was informed of her diagnosis and instructed to come back with any concerns or worsening of condition.  she and was agreeable to the plan as discussed above.  she was given the opportunity to ask questions.  All of the patient's questions were answered.    Procedures   Procedures    This was a shared visit with an ED attending.  The patient was seen and discussed with the ED attending, Dr. Johnson.    Ismael Johnson DO  Emergency Medicine, PGY-2     Courtney Mccoy MD  Resident  06/22/25 8779      The patient was seen by the resident/fellow.  I have personally performed a substantive portion of the encounter.  I have seen and examined the patient; agree with the workup, evaluation, MDM, management and diagnosis.  The care plan has been discussed with the resident; I have reviewed the resident’s note and agree with the documented findings.           I was present during all critical and key portions of the procedure(s) and immediately available to furnish services the entire duration.  See resident note for details.                                                                                                                            Ismael Johnson DO  06/22/25 [id number]

## 2025-06-24 RX ORDER — BACITRACIN ZINC 500 UNIT/G
OINTMENT IN PACKET (EA) TOPICAL ONCE
Status: ACTIVE | OUTPATIENT
Start: 2025-06-19

## 2025-07-08 ENCOUNTER — OFFICE VISIT (OUTPATIENT)
Dept: WOUND CARE | Facility: CLINIC | Age: 30
End: 2025-07-08
Payer: MEDICAID

## 2025-07-08 PROCEDURE — 99213 OFFICE O/P EST LOW 20 MIN: CPT

## 2025-07-31 RX ORDER — FOLIC ACID/MULTIVIT,IRON,MINER 0.4MG-18MG
1 TABLET ORAL DAILY
Qty: 90 TABLET | Refills: 3 | Status: SHIPPED | OUTPATIENT
Start: 2025-07-31